# Patient Record
Sex: FEMALE | Race: BLACK OR AFRICAN AMERICAN | Employment: FULL TIME | URBAN - METROPOLITAN AREA
[De-identification: names, ages, dates, MRNs, and addresses within clinical notes are randomized per-mention and may not be internally consistent; named-entity substitution may affect disease eponyms.]

---

## 2017-01-19 ENCOUNTER — HOSPITAL ENCOUNTER (EMERGENCY)
Age: 52
Discharge: HOME | End: 2017-01-19
Attending: EMERGENCY MEDICINE
Payer: COMMERCIAL

## 2017-01-19 ENCOUNTER — APPOINTMENT (OUTPATIENT)
Dept: GENERAL RADIOLOGY | Age: 52
End: 2017-01-19
Attending: EMERGENCY MEDICINE
Payer: COMMERCIAL

## 2017-01-19 VITALS
RESPIRATION RATE: 13 BRPM | DIASTOLIC BLOOD PRESSURE: 84 MMHG | HEART RATE: 55 BPM | HEIGHT: 59 IN | SYSTOLIC BLOOD PRESSURE: 150 MMHG | BODY MASS INDEX: 26.21 KG/M2 | WEIGHT: 130 LBS | TEMPERATURE: 98.3 F | OXYGEN SATURATION: 98 %

## 2017-01-19 DIAGNOSIS — R42 VERTIGO: Primary | ICD-10-CM

## 2017-01-19 LAB
ALBUMIN SERPL BCP-MCNC: 3.7 G/DL (ref 3.5–5)
ALBUMIN/GLOB SERPL: 1 {RATIO} (ref 1.2–3.5)
ALP SERPL-CCNC: 61 U/L (ref 50–136)
ALT SERPL-CCNC: 30 U/L (ref 12–65)
ANION GAP BLD CALC-SCNC: 5 MMOL/L (ref 7–16)
AST SERPL W P-5'-P-CCNC: 24 U/L (ref 15–37)
ATRIAL RATE: 73 BPM
BASOPHILS # BLD AUTO: 0 K/UL (ref 0–0.2)
BASOPHILS # BLD: 0 % (ref 0–2)
BILIRUB SERPL-MCNC: 0.4 MG/DL (ref 0.2–1.1)
BUN SERPL-MCNC: 11 MG/DL (ref 6–23)
CALCIUM SERPL-MCNC: 8.8 MG/DL (ref 8.3–10.4)
CALCULATED P AXIS, ECG09: 94 DEGREES
CALCULATED R AXIS, ECG10: 85 DEGREES
CALCULATED T AXIS, ECG11: 66 DEGREES
CHLORIDE SERPL-SCNC: 106 MMOL/L (ref 98–107)
CO2 SERPL-SCNC: 29 MMOL/L (ref 21–32)
CREAT SERPL-MCNC: 0.67 MG/DL (ref 0.6–1)
DIAGNOSIS, 93000: NORMAL
DIASTOLIC BP, ECG02: NORMAL MMHG
DIFFERENTIAL METHOD BLD: ABNORMAL
EOSINOPHIL # BLD: 0 K/UL (ref 0–0.8)
EOSINOPHIL NFR BLD: 1 % (ref 0.5–7.8)
ERYTHROCYTE [DISTWIDTH] IN BLOOD BY AUTOMATED COUNT: 15.1 % (ref 11.9–14.6)
GLOBULIN SER CALC-MCNC: 3.6 G/DL (ref 2.3–3.5)
GLUCOSE SERPL-MCNC: 87 MG/DL (ref 65–100)
HCT VFR BLD AUTO: 39.5 % (ref 35.8–46.3)
HGB BLD-MCNC: 13.1 G/DL (ref 11.7–15.4)
IMM GRANULOCYTES # BLD: 0 K/UL (ref 0–0.5)
IMM GRANULOCYTES NFR BLD AUTO: 0 % (ref 0–5)
LYMPHOCYTES # BLD AUTO: 52 % (ref 13–44)
LYMPHOCYTES # BLD: 3 K/UL (ref 0.5–4.6)
MCH RBC QN AUTO: 23.4 PG (ref 26.1–32.9)
MCHC RBC AUTO-ENTMCNC: 33.2 G/DL (ref 31.4–35)
MCV RBC AUTO: 70.5 FL (ref 79.6–97.8)
MONOCYTES # BLD: 0.3 K/UL (ref 0.1–1.3)
MONOCYTES NFR BLD AUTO: 5 % (ref 4–12)
NEUTS SEG # BLD: 2.4 K/UL (ref 1.7–8.2)
NEUTS SEG NFR BLD AUTO: 42 % (ref 43–78)
P-R INTERVAL, ECG05: 180 MS
PLATELET # BLD AUTO: 169 K/UL (ref 150–450)
PMV BLD AUTO: 10.7 FL (ref 10.8–14.1)
POTASSIUM SERPL-SCNC: 3.8 MMOL/L (ref 3.5–5.1)
PROT SERPL-MCNC: 7.3 G/DL (ref 6.3–8.2)
Q-T INTERVAL, ECG07: 394 MS
QRS DURATION, ECG06: 76 MS
QTC CALCULATION (BEZET), ECG08: 434 MS
RBC # BLD AUTO: 5.6 M/UL (ref 4.05–5.25)
SODIUM SERPL-SCNC: 140 MMOL/L (ref 136–145)
SYSTOLIC BP, ECG01: NORMAL MMHG
TROPONIN I BLD-MCNC: 0 NG/ML (ref 0–0.08)
VENTRICULAR RATE, ECG03: 73 BPM
WBC # BLD AUTO: 5.7 K/UL (ref 4.3–11.1)

## 2017-01-19 PROCEDURE — 71020 XR CHEST PA LAT: CPT

## 2017-01-19 PROCEDURE — 85025 COMPLETE CBC W/AUTO DIFF WBC: CPT | Performed by: EMERGENCY MEDICINE

## 2017-01-19 PROCEDURE — 80053 COMPREHEN METABOLIC PANEL: CPT | Performed by: EMERGENCY MEDICINE

## 2017-01-19 PROCEDURE — 84484 ASSAY OF TROPONIN QUANT: CPT

## 2017-01-19 PROCEDURE — 93005 ELECTROCARDIOGRAM TRACING: CPT | Performed by: EMERGENCY MEDICINE

## 2017-01-19 PROCEDURE — 99284 EMERGENCY DEPT VISIT MOD MDM: CPT | Performed by: EMERGENCY MEDICINE

## 2017-01-19 RX ORDER — SODIUM CHLORIDE 0.9 % (FLUSH) 0.9 %
5-10 SYRINGE (ML) INJECTION AS NEEDED
Status: DISCONTINUED | OUTPATIENT
Start: 2017-01-19 | End: 2017-01-19 | Stop reason: HOSPADM

## 2017-01-19 RX ORDER — MECLIZINE HYDROCHLORIDE 25 MG/1
25 TABLET ORAL
Qty: 11 TAB | Refills: 0 | Status: SHIPPED | OUTPATIENT
Start: 2017-01-19 | End: 2017-01-29

## 2017-01-19 NOTE — LETTER
[address] [facility] EMERGENCY DEPT 
[address] [place] [id number] [phone] Work/School Note Date: 1/19/2017 To Whom It May concern: 
 
Phuong Bradshaw was seen and treated today in the emergency room by the following provider(s): 
Attending Provider: Kalli Finley MD.   
 
Phuong Bradshaw may return to work on 1/20/2017. Sincerely, Eloisa Batista

## 2017-01-19 NOTE — ED PROVIDER NOTES
HPI Comments: 66-year-old lady with a history of waking up feeling very dizzy. She said it felt like the room was spinning around her. Patient says that with the symptoms she had no weakness, numbness, or speech problems. Patient says she's never had this kind of problem before and as far she knows she has no known medical problems. She denies any fevers or chills. Patient says she did feel like she had some palpitations was not having any significant chest pain. Patient is a 46 y.o. female presenting with dizziness. The history is provided by the patient. Dizziness   Pertinent negatives include no shortness of breath, no chest pain, no vomiting, no confusion, no headaches and no nausea. Past Medical History:   Diagnosis Date    Anemia      not currently taking iron supplements    History of chicken pox     OAB (overactive bladder) 7/3/2014       Past Surgical History:   Procedure Laterality Date    Hx orthopaedic       right ankle    Hx tubal ligation      Hx dilation and curettage      Hx hysterectomy  2013     [facility]    Hx  section       x3         Family History:   Problem Relation Age of Onset    Hypertension Father     Diabetes Father     Colon Cancer Father 76    Malignant Hyperthermia Neg Hx     Pseudocholinesterase Deficiency Neg Hx     Delayed Awakening Neg Hx     Post-op Nausea/Vomiting Neg Hx     Emergence Delirium Neg Hx     Post-op Cognitive Dysfunction Neg Hx     Other Neg Hx     Cancer Neg Hx     Heart Attack Neg Hx     Breast Cancer Neg Hx     Ovarian Cancer Neg Hx        Social History     Social History    Marital status: SINGLE     Spouse name: N/A    Number of children: N/A    Years of education: N/A     Occupational History    Not on file.      Social History Main Topics    Smoking status: Current Every Day Smoker     Packs/day: 1.00     Years: 35.00    Smokeless tobacco: Never Used      Comment: 16 years    Alcohol use 0.0 oz/week     0 Standard drinks or equivalent per week      Comment: occosaional/social    Drug use: No    Sexual activity: Yes     Partners: Male     Birth control/ protection: Surgical     Other Topics Concern    Not on file     Social History Narrative         ALLERGIES: Review of patient's allergies indicates no known allergies. Review of Systems   Constitutional: Negative for chills, diaphoresis and fever. HENT: Negative for congestion, rhinorrhea and sore throat. Eyes: Negative for redness and visual disturbance. Respiratory: Negative for cough, chest tightness, shortness of breath and wheezing. Cardiovascular: Positive for palpitations. Negative for chest pain. Gastrointestinal: Negative for abdominal pain, blood in stool, diarrhea, nausea and vomiting. Endocrine: Negative for polydipsia and polyuria. Genitourinary: Negative for dysuria and hematuria. Musculoskeletal: Negative for arthralgias, myalgias and neck stiffness. Skin: Negative for rash. Allergic/Immunologic: Negative for environmental allergies and food allergies. Neurological: Positive for dizziness. Negative for weakness and headaches. Hematological: Negative for adenopathy. Does not bruise/bleed easily. Psychiatric/Behavioral: Negative for confusion and sleep disturbance. The patient is not nervous/anxious. Vitals:    01/19/17 0813 01/19/17 0819 01/19/17 0854 01/19/17 0907   BP: 185/88 154/85 155/84 151/86   Pulse: 73 70 (!) 59 (!) 59   Resp: 16 19 22 24   Temp: 97.9 °F (36.6 °C)      SpO2: 99% 99% 97%    Weight: 59 kg (130 lb)      Height: 4' 11\" (1.499 m)               Physical Exam   Constitutional: She is oriented to person, place, and time. She appears well-developed and well-nourished. HENT:   Head: Normocephalic and atraumatic. Eyes: Conjunctivae and EOM are normal. Pupils are equal, round, and reactive to light. Neck: Normal range of motion. Cardiovascular: Normal rate and regular rhythm. Pulmonary/Chest: Effort normal and breath sounds normal. No respiratory distress. She has no wheezes. She has no rales. She exhibits no tenderness. Abdominal: Soft. Bowel sounds are normal. There is no rebound and no guarding. Musculoskeletal: Normal range of motion. She exhibits no edema or tenderness. Lymphadenopathy:     She has no cervical adenopathy. Neurological: She is alert and oriented to person, place, and time. She has normal reflexes. Coordination normal.   Skin: Skin is warm and dry. Psychiatric: She has a normal mood and affect. Nursing note and vitals reviewed. MDM  Number of Diagnoses or Management Options  Diagnosis management comments: Patient's labs and cardiac monitoring are unremarkable. Her symptoms seem classic for vertigo. She had no coordination problems on exam.  Therefore I will discharge her home with some Antivert.     ED Course       Procedures

## 2017-01-19 NOTE — DISCHARGE INSTRUCTIONS
Return with any fevers, weakness, speech problems, worsening symptoms, or additional concerns. Follow-up with your primary care doctor in one or 2 days for reevaluation. Epley Maneuver for Vertigo: Exercises  Your Care Instructions  The Epley Maneuver is a series of movements your doctor may use to treat your vertigo. Here are the steps for the exercises. Your doctor or physical therapist will guide you through the movements. A single 10- to 15-minute session often is all that's needed. Crystal debris (canaliths) cause the vertigo. When your head is moved into different positions, the debris moves freely. This may cause your symptoms to stop. How to do the exercises  Step 1    · You will sit on the doctor's exam table. Your legs will be out in front of you. The doctor or physical therapist will turn your head so that it is long-term between looking straight ahead and looking to the side that causes the worst vertigo. · Without changing your head position, he or she will guide you back quickly. Your shoulders will be on the table. Your head will hang over the edge of the table. At this point, the side of your head that is causing the worst vertigo will face the floor. You'll stay in this position for 30 seconds or until your symptoms stop. Step 2    · Then, the doctor or physical therapist will turn your head to the other side. You don't need to lift your head. The other side of your head will face the floor. You will stay in this position for 30 seconds or until your symptoms stop. Step 3    · The doctor or physical therapist will help you roll your body in the same direction that your head is facing. You will lie on your side. (For example, if you are looking to your right, you will roll onto your right side.) The side that causes the worst symptoms should be facing up. You'll stay in this position for another 30 seconds or until your symptoms stop.   Step 4    · The doctor or physical therapist will then help you to sit back up. Your legs will hang off the table on the same side that you were facing. Follow-up care is a key part of your treatment and safety. Be sure to make and go to all appointments, and call your doctor if you are having problems. It's also a good idea to know your test results and keep a list of the medicines you take. Where can you learn more? Go to http://saurav-allison.info/. Enter E629 in the search box to learn more about \"Epley Maneuver for Vertigo: Exercises. \"  Current as of: July 29, 2016  Content Version: 11.1  © 6665-5782 [employer] Care instructions adapted under license by [employer] (which disclaims liability or warranty for this information). If you have questions about a medical condition or this instruction, always ask your healthcare professional. [address] any warranty or liability for your use of this information.

## 2017-01-19 NOTE — LETTER
[address] [facility] EMERGENCY DEPT 
[address] [place] [id number] 
[phone] Work/School Note Date: 1/19/2017 To Whom It May concern: 
 
Nadiya Dover was seen and treated today in the emergency room by the following provider(s): 
No providers found. Nadiya Dover may return to work on 1/23/2017. Sincerely, Willis Duverney

## 2018-03-15 PROBLEM — I10 ESSENTIAL HYPERTENSION: Status: ACTIVE | Noted: 2018-03-15

## 2018-03-15 PROBLEM — I70.0 ATHEROSCLEROSIS OF ABDOMINAL AORTA (HCC): Status: ACTIVE | Noted: 2018-03-15

## 2018-03-15 PROBLEM — G25.0 BENIGN ESSENTIAL TREMOR: Status: ACTIVE | Noted: 2018-03-15

## 2018-12-19 RX ORDER — SODIUM CHLORIDE 0.9 % (FLUSH) 0.9 %
5-10 SYRINGE (ML) INJECTION EVERY 8 HOURS
Status: CANCELLED | OUTPATIENT
Start: 2018-12-19

## 2018-12-19 RX ORDER — SODIUM CHLORIDE 0.9 % (FLUSH) 0.9 %
5-10 SYRINGE (ML) INJECTION AS NEEDED
Status: CANCELLED | OUTPATIENT
Start: 2018-12-19

## 2018-12-21 ENCOUNTER — HOSPITAL ENCOUNTER (OUTPATIENT)
Dept: SURGERY | Age: 53
Discharge: HOME | End: 2018-12-21

## 2018-12-26 ENCOUNTER — HOSPITAL ENCOUNTER (OUTPATIENT)
Dept: LAB | Age: 53
Discharge: HOME | End: 2018-12-26
Payer: COMMERCIAL

## 2018-12-26 VITALS — HEIGHT: 59 IN | BODY MASS INDEX: 24.19 KG/M2 | WEIGHT: 120 LBS

## 2018-12-26 LAB — HGB BLD-MCNC: 12.5 G/DL (ref 11.7–15.4)

## 2018-12-26 PROCEDURE — 36415 COLL VENOUS BLD VENIPUNCTURE: CPT

## 2018-12-26 PROCEDURE — 85018 HEMOGLOBIN: CPT

## 2018-12-26 RX ORDER — CHOLECALCIFEROL (VITAMIN D3) 125 MCG
CAPSULE ORAL AS NEEDED
COMMUNITY
End: 2021-02-19

## 2018-12-26 RX ORDER — ACETAMINOPHEN 325 MG/1
TABLET ORAL
COMMUNITY
End: 2020-11-25 | Stop reason: ALTCHOICE

## 2018-12-26 RX ORDER — IBUPROFEN 200 MG
200 TABLET ORAL
COMMUNITY
End: 2020-11-25 | Stop reason: ALTCHOICE

## 2018-12-26 NOTE — PERIOP NOTES
HGB done today WNL    Recent Results (from the past 12 hour(s))   HEMOGLOBIN    Collection Time: 12/26/18  2:10 PM   Result Value Ref Range    HGB 12.5 11.7 - 15.4 g/dL

## 2018-12-26 NOTE — PERIOP NOTES
Patient verified name and . Order for consent yes found in EHR and matches case posting; patient verifies procedure. Type 1b surgery, phone assessment complete. Orders yes received. Labs per surgeon: none  Labs per anesthesia protocol: hemoglobin per protocol~to be drawn at outpt lab. Patient answered medical/surgical history questions at their best of ability. All prior to admission medications documented in [facility]. Patient instructed to take the following medications the day of surgery according to anesthesia guidelines with a small sip of water: none . Hold all vitamins 7 days prior to surgery and NSAIDS 5 days prior to surgery. Medications to be held ADvil and Aleve for 5 days prior to surgery. Patient instructed on the following:  Arrive at A Entrance, time of arrival to be called the day before by 1700  NPO after midnight including gum, mints, and ice chips  Responsible adult must drive patient to the hospital, stay during surgery, and patient will  need supervision 24 hours after anesthesia  Use antibacterial soap in shower the night before surgery and on the morning of surgery  All piercings must be removed prior to arrival.    Leave all valuables (money and jewelry) at home but bring insurance card and ID on       DOS. Do not wear make-up, nail polish, lotions, cologne, perfumes, powders, or oil on skin. Patient teach back successful and patient demonstrates knowledge of instruction.

## 2018-12-27 ENCOUNTER — ANESTHESIA EVENT (OUTPATIENT)
Dept: SURGERY | Age: 53
End: 2018-12-27
Payer: COMMERCIAL

## 2018-12-27 RX ORDER — SODIUM CHLORIDE 0.9 % (FLUSH) 0.9 %
5-10 SYRINGE (ML) INJECTION AS NEEDED
Status: CANCELLED | OUTPATIENT
Start: 2018-12-27

## 2018-12-27 RX ORDER — SODIUM CHLORIDE 0.9 % (FLUSH) 0.9 %
5-10 SYRINGE (ML) INJECTION EVERY 8 HOURS
Status: CANCELLED | OUTPATIENT
Start: 2018-12-27

## 2018-12-28 ENCOUNTER — APPOINTMENT (OUTPATIENT)
Dept: GENERAL RADIOLOGY | Age: 53
End: 2018-12-28
Attending: PODIATRIST
Payer: COMMERCIAL

## 2018-12-28 ENCOUNTER — HOSPITAL ENCOUNTER (OUTPATIENT)
Age: 53
Setting detail: OUTPATIENT SURGERY
Discharge: HOME | End: 2018-12-28
Attending: PODIATRIST | Admitting: PODIATRIST
Payer: COMMERCIAL

## 2018-12-28 ENCOUNTER — ANESTHESIA (OUTPATIENT)
Dept: SURGERY | Age: 53
End: 2018-12-28
Payer: COMMERCIAL

## 2018-12-28 VITALS
SYSTOLIC BLOOD PRESSURE: 132 MMHG | BODY MASS INDEX: 24.19 KG/M2 | OXYGEN SATURATION: 96 % | TEMPERATURE: 98.8 F | HEART RATE: 85 BPM | WEIGHT: 120 LBS | HEIGHT: 59 IN | RESPIRATION RATE: 16 BRPM | DIASTOLIC BLOOD PRESSURE: 70 MMHG

## 2018-12-28 PROCEDURE — C1776 JOINT DEVICE (IMPLANTABLE): HCPCS | Performed by: PODIATRIST

## 2018-12-28 PROCEDURE — 74011000250 HC RX REV CODE- 250

## 2018-12-28 PROCEDURE — 77030002916 HC SUT ETHLN J&J -A: Performed by: PODIATRIST

## 2018-12-28 PROCEDURE — 74011000250 HC RX REV CODE- 250: Performed by: PODIATRIST

## 2018-12-28 PROCEDURE — 76210000021 HC REC RM PH II 0.5 TO 1 HR: Performed by: PODIATRIST

## 2018-12-28 PROCEDURE — 77030031139 HC SUT VCRL2 J&J -A: Performed by: PODIATRIST

## 2018-12-28 PROCEDURE — 77030010509 HC AIRWY LMA MSK TELE -A: Performed by: ANESTHESIOLOGY

## 2018-12-28 PROCEDURE — 77030013921: Performed by: PODIATRIST

## 2018-12-28 PROCEDURE — 88305 TISSUE EXAM BY PATHOLOGIST: CPT

## 2018-12-28 PROCEDURE — 74011250636 HC RX REV CODE- 250/636: Performed by: ANESTHESIOLOGY

## 2018-12-28 PROCEDURE — 76010000162 HC OR TIME 1.5 TO 2 HR INTENSV-TIER 1: Performed by: PODIATRIST

## 2018-12-28 PROCEDURE — 76210000006 HC OR PH I REC 0.5 TO 1 HR: Performed by: PODIATRIST

## 2018-12-28 PROCEDURE — 77030006788 HC BLD SAW OSC STRY -B: Performed by: PODIATRIST

## 2018-12-28 PROCEDURE — 77030018836 HC SOL IRR NACL ICUM -A: Performed by: PODIATRIST

## 2018-12-28 PROCEDURE — 74011250636 HC RX REV CODE- 250/636

## 2018-12-28 PROCEDURE — 73630 X-RAY EXAM OF FOOT: CPT

## 2018-12-28 PROCEDURE — 74011250636 HC RX REV CODE- 250/636: Performed by: PODIATRIST

## 2018-12-28 PROCEDURE — 88311 DECALCIFY TISSUE: CPT

## 2018-12-28 PROCEDURE — 76060000034 HC ANESTHESIA 1.5 TO 2 HR: Performed by: PODIATRIST

## 2018-12-28 PROCEDURE — 77030020782 HC GWN BAIR PAWS FLX 3M -B: Performed by: ANESTHESIOLOGY

## 2018-12-28 PROCEDURE — C1713 ANCHOR/SCREW BN/BN,TIS/BN: HCPCS | Performed by: PODIATRIST

## 2018-12-28 DEVICE — IMPLANTABLE DEVICE
Type: IMPLANTABLE DEVICE | Site: FOOT | Status: FUNCTIONAL
Brand: FUSEFORCE

## 2018-12-28 DEVICE — JOINT METATRSL MTP 10MM --: Type: IMPLANTABLE DEVICE | Site: FOOT | Status: FUNCTIONAL

## 2018-12-28 RX ORDER — LIDOCAINE HYDROCHLORIDE 20 MG/ML
INJECTION, SOLUTION EPIDURAL; INFILTRATION; INTRACAUDAL; PERINEURAL AS NEEDED
Status: DISCONTINUED | OUTPATIENT
Start: 2018-12-28 | End: 2018-12-28 | Stop reason: HOSPADM

## 2018-12-28 RX ORDER — MIDAZOLAM HYDROCHLORIDE 1 MG/ML
2 INJECTION, SOLUTION INTRAMUSCULAR; INTRAVENOUS ONCE
Status: DISCONTINUED | OUTPATIENT
Start: 2018-12-28 | End: 2018-12-28 | Stop reason: HOSPADM

## 2018-12-28 RX ORDER — FLUMAZENIL 0.1 MG/ML
0.2 INJECTION INTRAVENOUS
Status: DISCONTINUED | OUTPATIENT
Start: 2018-12-28 | End: 2018-12-28 | Stop reason: HOSPADM

## 2018-12-28 RX ORDER — OXYCODONE HYDROCHLORIDE 5 MG/1
5 TABLET ORAL
Status: DISCONTINUED | OUTPATIENT
Start: 2018-12-28 | End: 2018-12-28 | Stop reason: HOSPADM

## 2018-12-28 RX ORDER — CEFAZOLIN SODIUM/WATER 2 G/20 ML
2 SYRINGE (ML) INTRAVENOUS ONCE
Status: COMPLETED | OUTPATIENT
Start: 2018-12-28 | End: 2018-12-28

## 2018-12-28 RX ORDER — FENTANYL CITRATE 50 UG/ML
INJECTION, SOLUTION INTRAMUSCULAR; INTRAVENOUS AS NEEDED
Status: DISCONTINUED | OUTPATIENT
Start: 2018-12-28 | End: 2018-12-28 | Stop reason: HOSPADM

## 2018-12-28 RX ORDER — SODIUM CHLORIDE, SODIUM LACTATE, POTASSIUM CHLORIDE, CALCIUM CHLORIDE 600; 310; 30; 20 MG/100ML; MG/100ML; MG/100ML; MG/100ML
100 INJECTION, SOLUTION INTRAVENOUS CONTINUOUS
Status: DISCONTINUED | OUTPATIENT
Start: 2018-12-28 | End: 2018-12-28 | Stop reason: HOSPADM

## 2018-12-28 RX ORDER — DIPHENHYDRAMINE HYDROCHLORIDE 50 MG/ML
12.5 INJECTION, SOLUTION INTRAMUSCULAR; INTRAVENOUS
Status: DISCONTINUED | OUTPATIENT
Start: 2018-12-28 | End: 2018-12-28 | Stop reason: HOSPADM

## 2018-12-28 RX ORDER — MIDAZOLAM HYDROCHLORIDE 1 MG/ML
2 INJECTION, SOLUTION INTRAMUSCULAR; INTRAVENOUS
Status: COMPLETED | OUTPATIENT
Start: 2018-12-28 | End: 2018-12-28

## 2018-12-28 RX ORDER — PROPOFOL 10 MG/ML
INJECTION, EMULSION INTRAVENOUS AS NEEDED
Status: DISCONTINUED | OUTPATIENT
Start: 2018-12-28 | End: 2018-12-28 | Stop reason: HOSPADM

## 2018-12-28 RX ORDER — NALOXONE HYDROCHLORIDE 0.4 MG/ML
0.2 INJECTION, SOLUTION INTRAMUSCULAR; INTRAVENOUS; SUBCUTANEOUS AS NEEDED
Status: DISCONTINUED | OUTPATIENT
Start: 2018-12-28 | End: 2018-12-28 | Stop reason: HOSPADM

## 2018-12-28 RX ORDER — ACETAMINOPHEN 500 MG
1000 TABLET ORAL ONCE
Status: DISCONTINUED | OUTPATIENT
Start: 2018-12-28 | End: 2018-12-28 | Stop reason: HOSPADM

## 2018-12-28 RX ORDER — LIDOCAINE HYDROCHLORIDE 10 MG/ML
0.1 INJECTION INFILTRATION; PERINEURAL AS NEEDED
Status: DISCONTINUED | OUTPATIENT
Start: 2018-12-28 | End: 2018-12-28 | Stop reason: HOSPADM

## 2018-12-28 RX ORDER — DEXAMETHASONE SODIUM PHOSPHATE 4 MG/ML
INJECTION, SOLUTION INTRA-ARTICULAR; INTRALESIONAL; INTRAMUSCULAR; INTRAVENOUS; SOFT TISSUE AS NEEDED
Status: DISCONTINUED | OUTPATIENT
Start: 2018-12-28 | End: 2018-12-28 | Stop reason: HOSPADM

## 2018-12-28 RX ORDER — BUPIVACAINE HYDROCHLORIDE 7.5 MG/ML
INJECTION, SOLUTION EPIDURAL; RETROBULBAR AS NEEDED
Status: DISCONTINUED | OUTPATIENT
Start: 2018-12-28 | End: 2018-12-28 | Stop reason: HOSPADM

## 2018-12-28 RX ORDER — FENTANYL CITRATE 50 UG/ML
100 INJECTION, SOLUTION INTRAMUSCULAR; INTRAVENOUS ONCE
Status: DISCONTINUED | OUTPATIENT
Start: 2018-12-28 | End: 2018-12-28 | Stop reason: HOSPADM

## 2018-12-28 RX ORDER — EPHEDRINE SULFATE 50 MG/ML
INJECTION, SOLUTION INTRAVENOUS AS NEEDED
Status: DISCONTINUED | OUTPATIENT
Start: 2018-12-28 | End: 2018-12-28 | Stop reason: HOSPADM

## 2018-12-28 RX ORDER — ONDANSETRON 2 MG/ML
INJECTION INTRAMUSCULAR; INTRAVENOUS AS NEEDED
Status: DISCONTINUED | OUTPATIENT
Start: 2018-12-28 | End: 2018-12-28 | Stop reason: HOSPADM

## 2018-12-28 RX ORDER — HYDROMORPHONE HYDROCHLORIDE 2 MG/ML
0.5 INJECTION, SOLUTION INTRAMUSCULAR; INTRAVENOUS; SUBCUTANEOUS
Status: DISCONTINUED | OUTPATIENT
Start: 2018-12-28 | End: 2018-12-28 | Stop reason: HOSPADM

## 2018-12-28 RX ORDER — OXYCODONE HYDROCHLORIDE 5 MG/1
10 TABLET ORAL
Status: DISCONTINUED | OUTPATIENT
Start: 2018-12-28 | End: 2018-12-28 | Stop reason: HOSPADM

## 2018-12-28 RX ORDER — KETOROLAC TROMETHAMINE 30 MG/ML
INJECTION, SOLUTION INTRAMUSCULAR; INTRAVENOUS AS NEEDED
Status: DISCONTINUED | OUTPATIENT
Start: 2018-12-28 | End: 2018-12-28 | Stop reason: HOSPADM

## 2018-12-28 RX ADMIN — EPHEDRINE SULFATE 5 MG: 50 INJECTION, SOLUTION INTRAVENOUS at 09:13

## 2018-12-28 RX ADMIN — EPHEDRINE SULFATE 5 MG: 50 INJECTION, SOLUTION INTRAVENOUS at 09:52

## 2018-12-28 RX ADMIN — Medication 2 G: at 08:32

## 2018-12-28 RX ADMIN — LIDOCAINE HYDROCHLORIDE 0.1 ML: 10 INJECTION, SOLUTION INFILTRATION; PERINEURAL at 08:11

## 2018-12-28 RX ADMIN — FENTANYL CITRATE 25 MCG: 50 INJECTION, SOLUTION INTRAMUSCULAR; INTRAVENOUS at 10:10

## 2018-12-28 RX ADMIN — EPHEDRINE SULFATE 5 MG: 50 INJECTION, SOLUTION INTRAVENOUS at 09:01

## 2018-12-28 RX ADMIN — EPHEDRINE SULFATE 10 MG: 50 INJECTION, SOLUTION INTRAVENOUS at 09:04

## 2018-12-28 RX ADMIN — KETOROLAC TROMETHAMINE 15 MG: 30 INJECTION, SOLUTION INTRAMUSCULAR; INTRAVENOUS at 09:40

## 2018-12-28 RX ADMIN — FENTANYL CITRATE 25 MCG: 50 INJECTION, SOLUTION INTRAMUSCULAR; INTRAVENOUS at 08:55

## 2018-12-28 RX ADMIN — LIDOCAINE HYDROCHLORIDE 60 MG: 20 INJECTION, SOLUTION EPIDURAL; INFILTRATION; INTRACAUDAL; PERINEURAL at 08:35

## 2018-12-28 RX ADMIN — PROPOFOL 200 MG: 10 INJECTION, EMULSION INTRAVENOUS at 08:35

## 2018-12-28 RX ADMIN — EPHEDRINE SULFATE 5 MG: 50 INJECTION, SOLUTION INTRAVENOUS at 09:35

## 2018-12-28 RX ADMIN — DEXAMETHASONE SODIUM PHOSPHATE 10 MG: 4 INJECTION, SOLUTION INTRA-ARTICULAR; INTRALESIONAL; INTRAMUSCULAR; INTRAVENOUS; SOFT TISSUE at 08:56

## 2018-12-28 RX ADMIN — FENTANYL CITRATE 25 MCG: 50 INJECTION, SOLUTION INTRAMUSCULAR; INTRAVENOUS at 09:26

## 2018-12-28 RX ADMIN — EPHEDRINE SULFATE 15 MG: 50 INJECTION, SOLUTION INTRAVENOUS at 08:42

## 2018-12-28 RX ADMIN — FENTANYL CITRATE 50 MCG: 50 INJECTION, SOLUTION INTRAMUSCULAR; INTRAVENOUS at 08:35

## 2018-12-28 RX ADMIN — FENTANYL CITRATE 25 MCG: 50 INJECTION, SOLUTION INTRAMUSCULAR; INTRAVENOUS at 09:28

## 2018-12-28 RX ADMIN — SODIUM CHLORIDE, SODIUM LACTATE, POTASSIUM CHLORIDE, AND CALCIUM CHLORIDE: 600; 310; 30; 20 INJECTION, SOLUTION INTRAVENOUS at 10:02

## 2018-12-28 RX ADMIN — FENTANYL CITRATE 50 MCG: 50 INJECTION, SOLUTION INTRAMUSCULAR; INTRAVENOUS at 09:46

## 2018-12-28 RX ADMIN — MIDAZOLAM 2 MG: 1 INJECTION INTRAMUSCULAR; INTRAVENOUS at 08:12

## 2018-12-28 RX ADMIN — SODIUM CHLORIDE, SODIUM LACTATE, POTASSIUM CHLORIDE, AND CALCIUM CHLORIDE 100 ML/HR: 600; 310; 30; 20 INJECTION, SOLUTION INTRAVENOUS at 08:11

## 2018-12-28 RX ADMIN — EPHEDRINE SULFATE 5 MG: 50 INJECTION, SOLUTION INTRAVENOUS at 09:15

## 2018-12-28 RX ADMIN — ONDANSETRON 4 MG: 2 INJECTION INTRAMUSCULAR; INTRAVENOUS at 08:58

## 2018-12-28 NOTE — ANESTHESIA PREPROCEDURE EVALUATION
Anesthetic History No history of anesthetic complications Review of Systems / Medical History Patient summary reviewed and pertinent labs reviewed Pulmonary Smoker Neuro/Psych Within defined limits Cardiovascular Exercise tolerance: >4 METS 
  
GI/Hepatic/Renal 
  
GERD Endo/Other Arthritis Other Findings Physical Exam 
 
Airway Mallampati: II 
TM Distance: 4 - 6 cm Neck ROM: normal range of motion Mouth opening: Normal 
 
 Cardiovascular Rhythm: regular Rate: normal 
 
 
 
 Dental 
 
 
  
Pulmonary Breath sounds clear to auscultation Abdominal 
 
 
 
 Other Findings Anesthetic Plan ASA: 2 Anesthesia type: general 
 
 
 
 
Induction: Intravenous Anesthetic plan and risks discussed with: Patient and Spouse

## 2018-12-28 NOTE — DISCHARGE INSTRUCTIONS
Leave the dressing on, clean and dry. Expect bleeding to occur through Sunday morning. Remain in the post operative shoe except for hygiene    Apply ice behind the knee of the operative foot to prevent swelling    Elevate above heart level    Dr. Dolores Chahal can be reached for urgent matters after hours at [phone]. Allow 1 hour for return call.        Instructions Following Ambulatory Surgery    Activity  · As tolerated and directed by your doctor  · Bathe or shower as directed by your doctor    Diet  · Clear liquids until no nausea or vomiting; then light diet for the first day  · Advance to regular diet on second day, unless your doctor orders otherwise  · If nausea and vomiting continues, call your doctor    Pain  · Take pain medication as directed by your doctor  ·  Call your doctor if pain is NOT relieved by medication  · DO NOT take aspirin or blood thinners until directed by your doctor    Follow-Up Phone Calls  · Will be made nursing staff  · If you have any problems, call your doctor as needed    Call your doctor if  · Excessive bleeding that does not stop after holding mild pressure over the area  · Temperature of 101 degrees F or above  · Redness,excessive swelling or bruising, and/or green or yellow, smelly discharge from incision    After Anesthesia  · For the first 24 hours: DO NOT Drive, Drink alcoholic beverages, or Make important decisions  · Be aware of dizziness following anesthesia and while taking pain medication

## 2018-12-28 NOTE — OP NOTES
Operative Report     Patient: Xiomy Blackwood MRN: [id number]  SSN: [id number]    YOB: 1965  Age: 48 y.o. Sex: female       Indications: The patient was admitted to the hospital with a history of hallux rigidus and painful lesion sub 5th metatarsal head right foot     Date of Surgery: 12/28/2018     Preoperative Diagnosis:    Acquired hallux valgus of right foot [M20.11]  Hallux rigidus, right foot [M20.21]  Foot deformity, right [M21.961]    Postoperative Diagnosis:   Acquired hallux valgus of right foot [M20.11]  Hallux rigidus, right foot [M20.21]  Foot deformity, right [M21.961]     Surgeon(s) and Role:     * Sophie Gonzales DPM - Primary      Anesthesia: General    Procedure:  Procedure(s):  RIGHT AKIN OSTEOTOMY    RIGHT FIRST MPJ CHEILECTOMY  WITH CARTIVA IMPLANT  EXCISION BONE RIGHT 5TH METATARSAL HEAD    Procedure in Detail:     The patient was transported to the operating room and placed on the operating table in the supine position. A time out and briefing were then performed with all members of the operating room staff to verify the correct patient, location and procedure. A well padded pneumatic tourniquet was applied to the right calf. The right lower extremity was then scrubbed, prepped and draped in sterile fashion. An Esmark bandage was used for exsanguination and the tourniquet was inflated to 250 mmHg.      Attention was then directed to the right foot where a linear incision was made overlying the 1st ray just medial to the EHL with a #15 blade. Sharp and blunt dissection was carried to the capsule and periosteum layer which was then resected from the bone with a #15 blade to expose the bone. Superficial bleeders were cauterized with a Bovie and vital neurovascular structures were carefully retracted from the surgical site. Osteophytes were encountered to the dorsal, medial and lateral 1st metatarsal head as well as the proximal phalanx base and were resected with a rongeur. All rough edges were smooth with a rasp. Copious irrigation occurred. The metatarsal head was inspected and noted to have central erosion of cartilage of 25% articular surface with eburnated bone exposed. Attention was directed to the proximal phalanx where a transverse closing wedge with the apex lateral was performed with a sagittal saw and closed medially to reduce the valgus deformity to the hallux. Once the osteotomy was closed sufficiently, fixation was obtained with a dorsally placed [facility] 10 mm x 10 mm staple.      Attention was returned to the metatarsal. Using the size guide for a 10 mm Cartiva implant, a guide wire was placed centrally in the metatarsal head. Upon removal of the size guide, there was noted proper bone surrounding the planned implant site. The reamer was introduced tot he 1st metatarsal head. Irrigation occurred to remove bone fragments. The size 10 mm Cartiva implant was then placed into the metatarsal head and noted to have appropriate bone clearance. ROM of the 1st MPJ was full and fluid. A linear skin incision was then made overlying the dorsal lateral 5th metatarsal right foot.  Sharp and blunt dissection was carried to the capsule and periosteum layer which was then resected from the bone with a #15 blade to expose the bone. Superficial bleeders were cauterized with a Bovie and vital neurovascular structures were carefully retracted from the surgical site. A large articulating bone fragment was encountered plantar to the metatarsal head and directly overlying the painful skin lesion. This was excised and full decompression of the skin was noted. The metatarsal head was rasped smooth with a power rasp and no pressure points remained. Copious irrigation then occurred with normal saline. Deep closure was obtained with 4-0 Vicryl and skin with 3-0 Nylon. A post operative injection of 10 cc Marcaine 0.75% plain was infiltrated to the operative site.  The right lower extremity was then dressed with xeroform, 4x4 gauze, Xuan, Webril and an ace bandage. The tourniquet was deflated at 78 minutes and immediate return of capillary refill was noted to all digits. The patient was transported to the PACU with vital signs stable. Findings:  first metatarsal head with central 25% cartilage loss. Large bone fragment with articiulation tot eh sub 5th metatarsal head    Estimated Blood Loss:  minimal    Drains: none           Specimens:   ID Type Source Tests Collected by Time Destination   1 : Right 5th metatarsal bone Preservative Foot, Right  Lenka Landa 12/28/2018 2210 Pathology               Implants:    Implant Name Type Inv.  Item Serial No.  Lot No. LRB No. Used Action   KIT IMPL [id number] NIT STRL -- Allison Amabile - [id number]  KIT IMPL [id number] NIT STRL -- [employer] [id number] [id number] Right 1 Implanted   JOINT METATRSL MTP 10MM --  - [id number]  JOINT METATRSL MTP 10MM --  [id number] [employer] [id number] Right 1 Implanted              Complications:  None    Signed By: Blaine Yates DPM     December 28, 2018

## 2018-12-28 NOTE — BRIEF OP NOTE
BRIEF OPERATIVE NOTE Date of Procedure: 12/28/2018 Preoperative Diagnosis: Acquired hallux valgus of right foot [M20.11] Hallux rigidus, right foot [M20.21] Foot deformity, right [M21.961] Postoperative Diagnosis: Acquired hallux valgus of right foot [M20.11] Hallux rigidus, right foot [M20.21] Foot deformity, right [M21.961] Procedure(s): RIGHT AKIN OSTEOTOMY  
RIGHT 1st MPJ CHEILECTOMY WITH CARTIVA IMPLANT 
RIGHT EXCISION BONE 5TH METATARSAL Surgeon(s) and Role: * Thomas Gonzales DPM - Primary Surgical Assistant: None Surgical Staff: 
Circ-1: Terra Sanford RN Scrub Tech-1: Phoenix Pickard Scrub RN-Relief: Saray Hoyos RN Event Time In Time Out Incision Start 4461 Incision Close 1008 Anesthesia: General  
Estimated Blood Loss: minimal 
Specimens:  
ID Type Source Tests Collected by Time Destination 1 : Right 5th metatarsal bone Preservative Foot, Right  Castro Burner 12/28/2018 5859 Pathology Findings: 1st MPJ with central 25% cartilage loss, large bone fragments tot he inferior 5th metatarsal head underlying painful callusing Complications: none Implants:  
Implant Name Type Inv. Item Serial No.  Lot No. LRB No. Used Action KIT IMPL [id number] NIT STRL -- [place] [employer] - [id number]  KIT IMPL [id number] NIT STRL -- [employer] [id number] [facility] [employer] [facility] [id number] Right 1 Implanted JOINT METATRSL MTP 10MM --  - [id number]  JOINT METATRSL MTP 10MM --  [id number] [employer] [id number] Right 1 Implanted

## 2018-12-28 NOTE — ANESTHESIA POSTPROCEDURE EVALUATION
Procedure(s): RIGHT AKIN OSTEOTOMY , Excision of bone 5th metatarsal right RIGHT WITH IMPLANT  FIRST MPJ CHEILECTOMY  GERMAN STAPLES SYSTEM CARTIVA IMPLANT. Anesthesia Post Evaluation Multimodal analgesia: multimodal analgesia used between 6 hours prior to anesthesia start to PACU discharge Patient location during evaluation: PACU Patient participation: complete - patient participated Level of consciousness: awake and alert Pain management: adequate Airway patency: patent Anesthetic complications: no 
Cardiovascular status: acceptable and hemodynamically stable Respiratory status: acceptable Hydration status: acceptable Visit Vitals /70 (BP 1 Location: Left arm, BP Patient Position: At rest) Pulse 85 Temp 37.1 °C (98.8 °F) Resp 16 Ht 4' 11\" (1.499 m) Wt 54.4 kg (120 lb) SpO2 96% BMI 24.24 kg/m²

## 2019-01-03 NOTE — H&P
Patient: Shai Spencer MRN: [id number]  SSN: [id number] YOB: 1965  Age: 47 y.o. Sex: female History and Physical 
 
Shai Spencer is a 47 y.o. female having Procedure(s): RIGHT AKIN OSTEOTOMY , Excision of bone 5th metatarsal right RIGHT WITH IMPLANT  FIRST MPJ CHEILECTOMY  GERMAN STAPLES SYSTEM CARTIVA IMPLANT. Allergies: No Known Allergies Chief Complaint: right foot pain History of Present Illness: right foot pain Past Medical History:  
Diagnosis Date  Anemia   
 not currently taking iron supplements; had hysterectomy  Arthritis   
 foot  GERD (gastroesophageal reflux disease)  History of chicken pox  Hypertension   
 pt denies  OAB (overactive bladder) 7/3/2014 Past Surgical History:  
Procedure Laterality Date  HX  SECTION    
 x3  
 HX DILATION AND CURETTAGE    
 HX HYSTERECTOMY  2013 [facility]  
 HX ORTHOPAEDIC    
 right ankle  HX TUBAL LIGATION Family History Problem Relation Age of Onset  Hypertension Father  Diabetes Father  Colon Cancer Father 76  No Known Problems Mother  Malignant Hyperthermia Neg Hx  Pseudocholinesterase Deficiency Neg Hx  Delayed Awakening Neg Hx  Post-op Nausea/Vomiting Neg Hx  Emergence Delirium Neg Hx  Post-op Cognitive Dysfunction Neg Hx   
 Other Neg Hx  Cancer Neg Hx   
 Heart Attack Neg Hx  Breast Cancer Neg Hx   
 Ovarian Cancer Neg Hx Social History Tobacco Use  Smoking status: Current Every Day Smoker Packs/day: 1.00 Years: 35.00 Pack years: 35.00  Smokeless tobacco: Never Used  Tobacco comment: 16 years Substance Use Topics  Alcohol use: Yes Alcohol/week: 0.0 oz  
  Comment: occosaional/social  
  
 
Prior to Admission medications Medication Sig Start Date End Date Taking?  Authorizing Provider  
acetaminophen (TYLENOL) 325 mg tablet Take  by mouth every four (4) hours as needed for Pain. Yes Provider, Historical  
naproxen sodium (ALEVE) 220 mg cap Take  by mouth as needed. Yes Provider, Historical  
ibuprofen (ADVIL) 200 mg tablet Take 200 mg by mouth every eight (8) hours as needed for Pain. Yes Provider, Historical  
  
 
Visit Vitals /70 (BP 1 Location: Left arm, BP Patient Position: At rest) Pulse 85 Temp 37.1 °C (98.8 °F) Resp 16 Ht 4' 11\" (1.499 m) Wt 54.4 kg (120 lb) SpO2 96% BMI 24.24 kg/m² Assessment and Plan:  
Jeanette Amaya is a 47 y.o. female having Procedure(s): RIGHT AKIN OSTEOTOMY , Excision of bone 5th metatarsal right RIGHT WITH IMPLANT  FIRST MPJ CHEILECTOMY  [facility] for right foot pain. Preanesthesia Evaluation No  note exists Signed By: Almita Shankar MD   
 January 2, 2019

## 2019-01-03 NOTE — H&P
Patient: Va Oshea MRN: [id number]  SSN: [id number] YOB: 1965  Age: 47 y.o. Sex: female History and Physical 
 
Va Oshea is a 47 y.o. female having Procedure(s): RIGHT AKIN OSTEOTOMY , Excision of bone 5th metatarsal right RIGHT WITH IMPLANT  FIRST MPJ CHEILECTOMY  GERMAN STAPLES SYSTEM CARTIVA IMPLANT. Allergies: No Known Allergies Chief Complaint: right foot pain History of Present Illness: right foot pain Past Medical History:  
Diagnosis Date  Anemia   
 not currently taking iron supplements; had hysterectomy  Arthritis   
 foot  GERD (gastroesophageal reflux disease)  History of chicken pox  Hypertension   
 pt denies  OAB (overactive bladder) 7/3/2014 Past Surgical History:  
Procedure Laterality Date  HX  SECTION    
 x3  
 HX DILATION AND CURETTAGE    
 HX HYSTERECTOMY  2013 [facility]  
 HX ORTHOPAEDIC    
 right ankle  HX TUBAL LIGATION Family History Problem Relation Age of Onset  Hypertension Father  Diabetes Father  Colon Cancer Father 76  No Known Problems Mother  Malignant Hyperthermia Neg Hx  Pseudocholinesterase Deficiency Neg Hx  Delayed Awakening Neg Hx  Post-op Nausea/Vomiting Neg Hx  Emergence Delirium Neg Hx  Post-op Cognitive Dysfunction Neg Hx   
 Other Neg Hx  Cancer Neg Hx   
 Heart Attack Neg Hx  Breast Cancer Neg Hx   
 Ovarian Cancer Neg Hx Social History Tobacco Use  Smoking status: Current Every Day Smoker Packs/day: 1.00 Years: 35.00 Pack years: 35.00  Smokeless tobacco: Never Used  Tobacco comment: 16 years Substance Use Topics  Alcohol use: Yes Alcohol/week: 0.0 oz  
  Comment: occosaional/social  
  
 
Prior to Admission medications Medication Sig Start Date End Date Taking?  Authorizing Provider  
acetaminophen (TYLENOL) 325 mg tablet Take  by mouth every four (4) hours as needed for Pain. Yes Provider, Historical  
naproxen sodium (ALEVE) 220 mg cap Take  by mouth as needed. Yes Provider, Historical  
ibuprofen (ADVIL) 200 mg tablet Take 200 mg by mouth every eight (8) hours as needed for Pain. Yes Provider, Historical  
  
 
Visit Vitals /70 (BP 1 Location: Left arm, BP Patient Position: At rest) Pulse 85 Temp 37.1 °C (98.8 °F) Resp 16 Ht 4' 11\" (1.499 m) Wt 54.4 kg (120 lb) SpO2 96% BMI 24.24 kg/m² Assessment and Plan:  
Va Oshea is a 47 y.o. female having Procedure(s): RIGHT AKIN OSTEOTOMY , Excision of bone 5th metatarsal right RIGHT WITH IMPLANT  FIRST MPJ CHEILECTOMY  GERMAN [address] IMPLANT for right foot pain. Preanesthesia Evaluation Last edited 12/28/18 0800 by Debi Mclean MD 
Date of Service 12/28/18 0726 Anesthetic History No history of anesthetic complications Review of Systems / Medical History Patient summary reviewed and pertinent labs reviewed Pulmonary Smoker Neuro/Psych Within defined limits Cardiovascular Exercise tolerance: >4 METS 
  
GI/Hepatic/Renal 
  
GERD Endo/Other Arthritis Other Findings Physical Exam 
 
Airway Mallampati: II 
TM Distance: 4 - 6 cm Neck ROM: normal range of motion Mouth opening: Normal 
 
 Cardiovascular Rhythm: regular Rate: normal 
 
 
 
 Dental 
 
 
  
Pulmonary Breath sounds clear to auscultation Abdominal 
 
 
 
 Other Findings Anesthetic Plan ASA: 2 Anesthesia type: general 
 
 
 
 
Induction: Intravenous Anesthetic plan and risks discussed with: Patient and Spouse Preanesthesia evaluation performed by Debi Mclean MD  
  
 
 
 
Signed By: Libia Marquis MD   
 January 2, 2019

## 2019-02-17 ENCOUNTER — HOSPITAL ENCOUNTER (EMERGENCY)
Age: 54
Discharge: HOME | End: 2019-02-17
Attending: EMERGENCY MEDICINE
Payer: COMMERCIAL

## 2019-02-17 VITALS
SYSTOLIC BLOOD PRESSURE: 164 MMHG | DIASTOLIC BLOOD PRESSURE: 89 MMHG | HEIGHT: 59 IN | OXYGEN SATURATION: 99 % | WEIGHT: 120 LBS | RESPIRATION RATE: 16 BRPM | HEART RATE: 84 BPM | TEMPERATURE: 99.1 F | BODY MASS INDEX: 24.19 KG/M2

## 2019-02-17 DIAGNOSIS — L08.0 ECTHYMA: Primary | ICD-10-CM

## 2019-02-17 PROCEDURE — 74011250637 HC RX REV CODE- 250/637: Performed by: EMERGENCY MEDICINE

## 2019-02-17 PROCEDURE — 99283 EMERGENCY DEPT VISIT LOW MDM: CPT | Performed by: EMERGENCY MEDICINE

## 2019-02-17 RX ORDER — MUPIROCIN 20 MG/G
OINTMENT TOPICAL DAILY
Status: DISCONTINUED | OUTPATIENT
Start: 2019-02-17 | End: 2019-02-17 | Stop reason: HOSPADM

## 2019-02-17 RX ORDER — CEPHALEXIN 500 MG/1
500 CAPSULE ORAL 3 TIMES DAILY
Qty: 21 CAP | Refills: 0 | Status: SHIPPED | OUTPATIENT
Start: 2019-02-17 | End: 2019-09-05

## 2019-02-17 RX ORDER — SULFAMETHOXAZOLE AND TRIMETHOPRIM 800; 160 MG/1; MG/1
1 TABLET ORAL 2 TIMES DAILY
Qty: 14 TAB | Refills: 0 | Status: SHIPPED | OUTPATIENT
Start: 2019-02-17 | End: 2019-09-05

## 2019-02-17 RX ADMIN — MUPIROCIN: 20 OINTMENT TOPICAL at 10:29

## 2019-02-17 NOTE — ED NOTES
I have reviewed discharge instructions with the patient. The patient verbalized understanding. Patient left ED via Discharge Method: ambulatory to Home with self. Opportunity for questions and clarification provided. Patient given 2 scripts. To continue your aftercare when you leave the hospital, you may receive an automated call from our care team to check in on how you are doing. This is a free service and part of our promise to provide the best care and service to meet your aftercare needs.  If you have questions, or wish to unsubscribe from this service please call [phone]. Thank you for Choosing our [facility] Emergency Department.

## 2019-02-17 NOTE — ED TRIAGE NOTES
Patient advises having surgery on right foot in December of 2018 and was having difficulty with wound healing and recently started using acticoat flex 3 bandaging about 2 weeks ago and 1 week ago started with itching burning rash to right lower leg. Patient advises wound still is not closed completely. Health Care Proxy (HCP)

## 2019-02-17 NOTE — DISCHARGE INSTRUCTIONS
Clean and dress area 1-2 times daily. Apply thin coat of Bactroban then nonstick dressing. Call your podiatrist and family doctor to consider recheck of this area in 48 hours. Recheck sooner for drainage, high fever or chills. Also consider having her doctor refer you to the wound center if things do not improve with your foot incision. Patient Education        Cellulitis: Care Instructions  Your Care Instructions    Cellulitis is a skin infection caused by bacteria, most often strep or staph. It often occurs after a break in the skin from a scrape, cut, bite, or puncture, or after a rash. Cellulitis may be treated without doing tests to find out what caused it. But your doctor may do tests, if needed, to look for a specific bacteria, like methicillin-resistant Staphylococcus aureus (MRSA). The doctor has checked you carefully, but problems can develop later. If you notice any problems or new symptoms, get medical treatment right away. Follow-up care is a key part of your treatment and safety. Be sure to make and go to all appointments, and call your doctor if you are having problems. It's also a good idea to know your test results and keep a list of the medicines you take. How can you care for yourself at home? · Take your antibiotics as directed. Do not stop taking them just because you feel better. You need to take the full course of antibiotics. · Prop up the infected area on pillows to reduce pain and swelling. Try to keep the area above the level of your heart as often as you can. · If your doctor told you how to care for your wound, follow your doctor's instructions. If you did not get instructions, follow this general advice:  ? Wash the wound with clean water 2 times a day. Don't use hydrogen peroxide or alcohol, which can slow healing. ? You may cover the wound with a thin layer of petroleum jelly, such as Vaseline, and a nonstick bandage. ?  Apply more petroleum jelly and replace the bandage as needed. · Be safe with medicines. Take pain medicines exactly as directed. ? If the doctor gave you a prescription medicine for pain, take it as prescribed. ? If you are not taking a prescription pain medicine, ask your doctor if you can take an over-the-counter medicine. To prevent cellulitis in the future  · Try to prevent cuts, scrapes, or other injuries to your skin. Cellulitis most often occurs where there is a break in the skin. · If you get a scrape, cut, mild burn, or bite, wash the wound with clean water as soon as you can to help avoid infection. Don't use hydrogen peroxide or alcohol, which can slow healing. · If you have swelling in your legs (edema), support stockings and good skin care may help prevent leg sores and cellulitis. · Take care of your feet, especially if you have diabetes or other conditions that increase the risk of infection. Wear shoes and socks. Do not go barefoot. If you have athlete's foot or other skin problems on your feet, talk to your doctor about how to treat them. When should you call for help? Call your doctor now or seek immediate medical care if:    · You have signs that your infection is getting worse, such as:  ? Increased pain, swelling, warmth, or redness. ? Red streaks leading from the area. ? Pus draining from the area. ? A fever.     · You get a rash.    Watch closely for changes in your health, and be sure to contact your doctor if:    · You do not get better as expected. Where can you learn more? Go to http://saurav-allison.info/. [place] in the search box to learn more about \"Cellulitis: Care Instructions. \"  Current as of: April 17, 2018  Content Version: 11.9  © 1618-5721 [employer]. Care instructions adapted under license by [employer] (which disclaims liability or warranty for this information).  If you have questions about a medical condition or this instruction, always ask your healthcare professional. [address] any warranty or liability for your use of this information.

## 2020-11-25 PROBLEM — E78.2 MIXED HYPERLIPIDEMIA: Status: ACTIVE | Noted: 2020-11-25

## 2020-12-10 ENCOUNTER — HOSPITAL ENCOUNTER (OUTPATIENT)
Dept: CT IMAGING | Age: 55
Discharge: HOME | End: 2020-12-10
Attending: INTERNAL MEDICINE
Payer: SELF-PAY

## 2020-12-10 DIAGNOSIS — Z13.6 SCREENING FOR HEART DISEASE: ICD-10-CM

## 2020-12-10 PROCEDURE — 75571 CT HRT W/O DYE W/CA TEST: CPT

## 2020-12-14 NOTE — PROGRESS NOTES
Ca Score came back over 1200 meaning she already has significant CAD. Needs to be on a daily baby Asa and a cholesterol medicine, Crestor 10. Needs to see Cardiology for Ca score of 1200.

## 2020-12-15 NOTE — PROGRESS NOTES
Left message to return call, results letter will be mailed, medication sent to pharmacy, and referral sent to Cardiology.

## 2020-12-31 ENCOUNTER — HOSPITAL ENCOUNTER (OUTPATIENT)
Dept: MAMMOGRAPHY | Age: 55
Discharge: HOME | End: 2020-12-31
Attending: INTERNAL MEDICINE
Payer: COMMERCIAL

## 2020-12-31 DIAGNOSIS — Z12.31 ENCOUNTER FOR SCREENING MAMMOGRAM FOR MALIGNANT NEOPLASM OF BREAST: ICD-10-CM

## 2020-12-31 PROCEDURE — 77067 SCR MAMMO BI INCL CAD: CPT

## 2021-01-04 NOTE — PROGRESS NOTES
Spoke with Michael Jensen she stated that patient needs additional views but they melissa take care of placing the order and contacting the patient as well.

## 2021-01-06 ENCOUNTER — HOSPITAL ENCOUNTER (OUTPATIENT)
Dept: MAMMOGRAPHY | Age: 56
Discharge: HOME | End: 2021-01-06
Attending: INTERNAL MEDICINE
Payer: COMMERCIAL

## 2021-01-06 DIAGNOSIS — R92.8 ABNORMAL SCREENING MAMMOGRAM: ICD-10-CM

## 2021-01-06 PROCEDURE — 77066 DX MAMMO INCL CAD BI: CPT

## 2021-01-06 PROCEDURE — 76642 ULTRASOUND BREAST LIMITED: CPT

## 2021-02-05 ENCOUNTER — HOSPITAL ENCOUNTER (OUTPATIENT)
Dept: LAB | Age: 56
Discharge: HOME | End: 2021-02-05
Payer: COMMERCIAL

## 2021-02-05 DIAGNOSIS — I25.110 CORONARY ARTERY DISEASE INVOLVING NATIVE CORONARY ARTERY OF NATIVE HEART WITH UNSTABLE ANGINA PECTORIS (HCC): ICD-10-CM

## 2021-02-05 LAB
ANION GAP SERPL CALC-SCNC: 2 MMOL/L (ref 7–16)
BASOPHILS # BLD: 0 K/UL (ref 0–0.2)
BASOPHILS NFR BLD: 0 % (ref 0–2)
BUN SERPL-MCNC: 13 MG/DL (ref 6–23)
CALCIUM SERPL-MCNC: 9.1 MG/DL (ref 8.3–10.4)
CHLORIDE SERPL-SCNC: 109 MMOL/L (ref 98–107)
CO2 SERPL-SCNC: 29 MMOL/L (ref 21–32)
CREAT SERPL-MCNC: 0.74 MG/DL (ref 0.6–1)
DIFFERENTIAL METHOD BLD: ABNORMAL
EOSINOPHIL # BLD: 0.1 K/UL (ref 0–0.8)
EOSINOPHIL NFR BLD: 1 % (ref 0.5–7.8)
ERYTHROCYTE [DISTWIDTH] IN BLOOD BY AUTOMATED COUNT: 15.4 % (ref 11.9–14.6)
GLUCOSE SERPL-MCNC: 80 MG/DL (ref 65–100)
HCT VFR BLD AUTO: 38.6 % (ref 35.8–46.3)
HGB BLD-MCNC: 12.3 G/DL (ref 11.7–15.4)
IMM GRANULOCYTES # BLD AUTO: 0 K/UL (ref 0–0.5)
IMM GRANULOCYTES NFR BLD AUTO: 0 % (ref 0–5)
LYMPHOCYTES # BLD: 2.8 K/UL (ref 0.5–4.6)
LYMPHOCYTES NFR BLD: 58 % (ref 13–44)
MCH RBC QN AUTO: 22.9 PG (ref 26.1–32.9)
MCHC RBC AUTO-ENTMCNC: 31.9 G/DL (ref 31.4–35)
MCV RBC AUTO: 72 FL (ref 79.6–97.8)
MONOCYTES # BLD: 0.4 K/UL (ref 0.1–1.3)
MONOCYTES NFR BLD: 7 % (ref 4–12)
NEUTS SEG # BLD: 1.6 K/UL (ref 1.7–8.2)
NEUTS SEG NFR BLD: 34 % (ref 43–78)
NRBC # BLD: 0 K/UL (ref 0–0.2)
PLATELET # BLD AUTO: 186 K/UL (ref 150–450)
PMV BLD AUTO: 11.9 FL (ref 9.4–12.3)
POTASSIUM SERPL-SCNC: 4.3 MMOL/L (ref 3.5–5.1)
RBC # BLD AUTO: 5.36 M/UL (ref 4.05–5.2)
SODIUM SERPL-SCNC: 140 MMOL/L (ref 136–145)
WBC # BLD AUTO: 4.9 K/UL (ref 4.3–11.1)

## 2021-02-05 PROCEDURE — 85025 COMPLETE CBC W/AUTO DIFF WBC: CPT

## 2021-02-05 PROCEDURE — 80048 BASIC METABOLIC PNL TOTAL CA: CPT

## 2021-02-05 PROCEDURE — 36415 COLL VENOUS BLD VENIPUNCTURE: CPT

## 2021-02-18 NOTE — PROGRESS NOTES
Do you currently have any signs or symptoms of respiratory infection, such as fever, cough, shortness of breath, or sore throat? no    In the last 14 days have you had contact with someone with confirmed or presumptive diagnosis of COVID-19 or someone under investigation of COVID-19?  no    In the last 14 days have you traveled or have someone in your home who has traveled to [place], [facility], [facility], [place], [place], [place], [place], or [place]? no

## 2021-02-18 NOTE — PROGRESS NOTES
Patient pre-assessment complete for [facility] scheduled for 2021, arrival time 0600. Patient verified using . Patient instructed to bring all home medications in labeled bottles on the day of procedure. NPO status reinforced. Patient informed to take a full dose aspirin 325mg  or 81 mg x 4 on the day of procedure. Patient instructed to HOLD aleve, chantix. Instructed they can take all other medications excluding vitamins & supplements. Patient verbalizes understanding of all instructions & denies any questions at this time.

## 2021-02-19 ENCOUNTER — HOSPITAL ENCOUNTER (OUTPATIENT)
Dept: CARDIAC CATH/INVASIVE PROCEDURES | Age: 56
Discharge: HOME | End: 2021-02-19
Attending: INTERNAL MEDICINE | Admitting: INTERNAL MEDICINE
Payer: COMMERCIAL

## 2021-02-19 VITALS
HEART RATE: 64 BPM | DIASTOLIC BLOOD PRESSURE: 86 MMHG | OXYGEN SATURATION: 100 % | RESPIRATION RATE: 16 BRPM | WEIGHT: 135 LBS | BODY MASS INDEX: 27.27 KG/M2 | SYSTOLIC BLOOD PRESSURE: 147 MMHG

## 2021-02-19 DIAGNOSIS — I10 ESSENTIAL HYPERTENSION: ICD-10-CM

## 2021-02-19 DIAGNOSIS — I25.119 CORONARY ARTERY DISEASE INVOLVING NATIVE CORONARY ARTERY OF NATIVE HEART WITH ANGINA PECTORIS (HCC): ICD-10-CM

## 2021-02-19 DIAGNOSIS — E78.2 MIXED HYPERLIPIDEMIA: ICD-10-CM

## 2021-02-19 PROBLEM — I25.10 CAD (CORONARY ARTERY DISEASE): Status: ACTIVE | Noted: 2021-02-19

## 2021-02-19 LAB
ATRIAL RATE: 56 BPM
ATRIAL RATE: 60 BPM
ATRIAL RATE: 60 BPM
CALCULATED P AXIS, ECG09: 23 DEGREES
CALCULATED P AXIS, ECG09: 31 DEGREES
CALCULATED P AXIS, ECG09: 68 DEGREES
CALCULATED R AXIS, ECG10: 33 DEGREES
CALCULATED R AXIS, ECG10: 36 DEGREES
CALCULATED R AXIS, ECG10: 48 DEGREES
CALCULATED T AXIS, ECG11: 49 DEGREES
CALCULATED T AXIS, ECG11: 52 DEGREES
CALCULATED T AXIS, ECG11: 60 DEGREES
DIAGNOSIS, 93000: NORMAL
P-R INTERVAL, ECG05: 170 MS
P-R INTERVAL, ECG05: 188 MS
P-R INTERVAL, ECG05: 190 MS
Q-T INTERVAL, ECG07: 426 MS
Q-T INTERVAL, ECG07: 428 MS
Q-T INTERVAL, ECG07: 430 MS
QRS DURATION, ECG06: 68 MS
QRS DURATION, ECG06: 72 MS
QRS DURATION, ECG06: 74 MS
QTC CALCULATION (BEZET), ECG08: 413 MS
QTC CALCULATION (BEZET), ECG08: 426 MS
QTC CALCULATION (BEZET), ECG08: 430 MS
VENTRICULAR RATE, ECG03: 56 BPM
VENTRICULAR RATE, ECG03: 60 BPM
VENTRICULAR RATE, ECG03: 60 BPM

## 2021-02-19 PROCEDURE — C1887 CATHETER, GUIDING: HCPCS

## 2021-02-19 PROCEDURE — 93572 IV DOP VEL&/PRESS C FLO EA: CPT

## 2021-02-19 PROCEDURE — 77030016699 HC CATH ANGI DX INFN1 CARD -A

## 2021-02-19 PROCEDURE — 74011000258 HC RX REV CODE- 258: Performed by: INTERNAL MEDICINE

## 2021-02-19 PROCEDURE — 93458 L HRT ARTERY/VENTRICLE ANGIO: CPT | Performed by: INTERNAL MEDICINE

## 2021-02-19 PROCEDURE — 99152 MOD SED SAME PHYS/QHP 5/>YRS: CPT | Performed by: INTERNAL MEDICINE

## 2021-02-19 PROCEDURE — 74011000250 HC RX REV CODE- 250: Performed by: INTERNAL MEDICINE

## 2021-02-19 PROCEDURE — 85347 COAGULATION TIME ACTIVATED: CPT

## 2021-02-19 PROCEDURE — 92928 PRQ TCAT PLMT NTRAC ST 1 LES: CPT | Performed by: INTERNAL MEDICINE

## 2021-02-19 PROCEDURE — 99156 MOD SED OTH PHYS/QHP 5/>YRS: CPT

## 2021-02-19 PROCEDURE — 93005 ELECTROCARDIOGRAM TRACING: CPT | Performed by: INTERNAL MEDICINE

## 2021-02-19 PROCEDURE — 92928 PRQ TCAT PLMT NTRAC ST 1 LES: CPT

## 2021-02-19 PROCEDURE — C1725 CATH, TRANSLUMIN NON-LASER: HCPCS

## 2021-02-19 PROCEDURE — 93571 IV DOP VEL&/PRESS C FLO 1ST: CPT | Performed by: INTERNAL MEDICINE

## 2021-02-19 PROCEDURE — 77030015766

## 2021-02-19 PROCEDURE — 77030042317 HC BND COMPR HEMSTAT -B

## 2021-02-19 PROCEDURE — C1769 GUIDE WIRE: HCPCS

## 2021-02-19 PROCEDURE — 74011250636 HC RX REV CODE- 250/636: Performed by: INTERNAL MEDICINE

## 2021-02-19 PROCEDURE — 93571 IV DOP VEL&/PRESS C FLO 1ST: CPT

## 2021-02-19 PROCEDURE — C1894 INTRO/SHEATH, NON-LASER: HCPCS

## 2021-02-19 PROCEDURE — 93458 L HRT ARTERY/VENTRICLE ANGIO: CPT

## 2021-02-19 PROCEDURE — 99153 MOD SED SAME PHYS/QHP EA: CPT

## 2021-02-19 PROCEDURE — 99152 MOD SED SAME PHYS/QHP 5/>YRS: CPT

## 2021-02-19 PROCEDURE — 74011250637 HC RX REV CODE- 250/637: Performed by: INTERNAL MEDICINE

## 2021-02-19 PROCEDURE — 74011000636 HC RX REV CODE- 636: Performed by: INTERNAL MEDICINE

## 2021-02-19 PROCEDURE — 77030012468 HC VLV BLEEDBK CNTRL ABBT -B

## 2021-02-19 PROCEDURE — C1874 STENT, COATED/COV W/DEL SYS: HCPCS

## 2021-02-19 RX ORDER — GUAIFENESIN 100 MG/5ML
81 LIQUID (ML) ORAL DAILY
Status: CANCELLED | OUTPATIENT
Start: 2021-02-19

## 2021-02-19 RX ORDER — ROSUVASTATIN CALCIUM 20 MG/1
20 TABLET, COATED ORAL
Qty: 90 TAB | Refills: 3 | Status: SHIPPED | OUTPATIENT
Start: 2021-02-19 | End: 2022-03-03 | Stop reason: SDUPTHER

## 2021-02-19 RX ORDER — MORPHINE SULFATE 2 MG/ML
2 INJECTION, SOLUTION INTRAMUSCULAR; INTRAVENOUS
Status: CANCELLED | OUTPATIENT
Start: 2021-02-19

## 2021-02-19 RX ORDER — RANOLAZINE 500 MG/1
500 TABLET, EXTENDED RELEASE ORAL 2 TIMES DAILY
Qty: 60 TAB | Refills: 11 | Status: SHIPPED | OUTPATIENT
Start: 2021-02-19 | End: 2021-02-19 | Stop reason: SDUPTHER

## 2021-02-19 RX ORDER — SODIUM CHLORIDE 0.9 % (FLUSH) 0.9 %
5-40 SYRINGE (ML) INJECTION EVERY 8 HOURS
Status: CANCELLED | OUTPATIENT
Start: 2021-02-19

## 2021-02-19 RX ORDER — FENTANYL CITRATE 50 UG/ML
25-50 INJECTION, SOLUTION INTRAMUSCULAR; INTRAVENOUS
Status: DISCONTINUED | OUTPATIENT
Start: 2021-02-19 | End: 2021-02-19 | Stop reason: HOSPADM

## 2021-02-19 RX ORDER — HEPARIN SODIUM 10000 [USP'U]/ML
1000-10000 INJECTION, SOLUTION INTRAVENOUS; SUBCUTANEOUS
Status: DISCONTINUED | OUTPATIENT
Start: 2021-02-19 | End: 2021-02-19

## 2021-02-19 RX ORDER — ISOSORBIDE MONONITRATE 60 MG/1
60 TABLET, EXTENDED RELEASE ORAL DAILY
Qty: 180 TAB | Refills: 3 | OUTPATIENT
Start: 2021-02-19 | End: 2021-02-19 | Stop reason: SDUPTHER

## 2021-02-19 RX ORDER — ISOSORBIDE MONONITRATE 60 MG/1
60 TABLET, EXTENDED RELEASE ORAL DAILY
Qty: 90 TAB | Refills: 3 | Status: SHIPPED | OUTPATIENT
Start: 2021-02-19 | End: 2022-03-03 | Stop reason: SDUPTHER

## 2021-02-19 RX ORDER — SODIUM CHLORIDE 0.9 % (FLUSH) 0.9 %
5-40 SYRINGE (ML) INJECTION AS NEEDED
Status: CANCELLED | OUTPATIENT
Start: 2021-02-19

## 2021-02-19 RX ORDER — LIDOCAINE HYDROCHLORIDE 10 MG/ML
1-30 INJECTION, SOLUTION EPIDURAL; INFILTRATION; INTRACAUDAL; PERINEURAL ONCE
Status: COMPLETED | OUTPATIENT
Start: 2021-02-19 | End: 2021-02-19

## 2021-02-19 RX ORDER — ROSUVASTATIN CALCIUM 10 MG/1
20 TABLET, COATED ORAL
Qty: 90 TAB | Refills: 1 | Status: SHIPPED | OUTPATIENT
Start: 2021-02-19 | End: 2021-02-19 | Stop reason: SDUPTHER

## 2021-02-19 RX ORDER — LORAZEPAM 1 MG/1
1 TABLET ORAL
Status: CANCELLED | OUTPATIENT
Start: 2021-02-19

## 2021-02-19 RX ORDER — SODIUM CHLORIDE 9 MG/ML
75 INJECTION, SOLUTION INTRAVENOUS CONTINUOUS
Status: CANCELLED | OUTPATIENT
Start: 2021-02-19 | End: 2021-02-19

## 2021-02-19 RX ORDER — MIDAZOLAM HYDROCHLORIDE 1 MG/ML
.5-2 INJECTION, SOLUTION INTRAMUSCULAR; INTRAVENOUS
Status: DISCONTINUED | OUTPATIENT
Start: 2021-02-19 | End: 2021-02-19 | Stop reason: HOSPADM

## 2021-02-19 RX ORDER — NITROGLYCERIN 0.4 MG/1
0.4 TABLET SUBLINGUAL
Status: CANCELLED | OUTPATIENT
Start: 2021-02-19

## 2021-02-19 RX ORDER — SODIUM CHLORIDE 9 MG/ML
75 INJECTION, SOLUTION INTRAVENOUS CONTINUOUS
Status: DISCONTINUED | OUTPATIENT
Start: 2021-02-19 | End: 2021-02-19 | Stop reason: HOSPADM

## 2021-02-19 RX ORDER — ISOSORBIDE MONONITRATE 60 MG/1
60 TABLET, EXTENDED RELEASE ORAL DAILY
Qty: 30 TAB | Refills: 11 | Status: SHIPPED | OUTPATIENT
Start: 2021-02-19 | End: 2021-02-19 | Stop reason: SDUPTHER

## 2021-02-19 RX ORDER — GUAIFENESIN 100 MG/5ML
324-325 LIQUID (ML) ORAL ONCE
Status: COMPLETED | OUTPATIENT
Start: 2021-02-19 | End: 2021-02-19

## 2021-02-19 RX ORDER — RANOLAZINE 500 MG/1
500 TABLET, EXTENDED RELEASE ORAL 2 TIMES DAILY
Qty: 60 TAB | Refills: 11 | Status: SHIPPED | OUTPATIENT
Start: 2021-02-19

## 2021-02-19 RX ORDER — HEPARIN SODIUM 200 [USP'U]/100ML
3 INJECTION, SOLUTION INTRAVENOUS CONTINUOUS
Status: DISCONTINUED | OUTPATIENT
Start: 2021-02-19 | End: 2021-02-19 | Stop reason: HOSPADM

## 2021-02-19 RX ORDER — RANOLAZINE 500 MG/1
500 TABLET, EXTENDED RELEASE ORAL 2 TIMES DAILY
Qty: 60 TAB | Refills: 11 | OUTPATIENT
Start: 2021-02-19 | End: 2021-02-19 | Stop reason: SDUPTHER

## 2021-02-19 RX ORDER — ISOSORBIDE MONONITRATE 60 MG/1
60 TABLET, EXTENDED RELEASE ORAL DAILY
Qty: 90 TAB | Refills: 3 | OUTPATIENT
Start: 2021-02-19 | End: 2021-02-19 | Stop reason: SDUPTHER

## 2021-02-19 RX ORDER — ACETAMINOPHEN 325 MG/1
650 TABLET ORAL
Status: CANCELLED | OUTPATIENT
Start: 2021-02-19

## 2021-02-19 RX ORDER — ROSUVASTATIN CALCIUM 20 MG/1
20 TABLET, COATED ORAL
Qty: 90 TAB | Refills: 3 | OUTPATIENT
Start: 2021-02-19 | End: 2021-02-19 | Stop reason: SDUPTHER

## 2021-02-19 RX ADMIN — FENTANYL CITRATE 25 MCG: 50 INJECTION, SOLUTION INTRAMUSCULAR; INTRAVENOUS at 08:25

## 2021-02-19 RX ADMIN — NITROGLYCERIN 0.2 MG: 200 INJECTION, SOLUTION INTRAVENOUS at 08:43

## 2021-02-19 RX ADMIN — FENTANYL CITRATE 25 MCG: 50 INJECTION, SOLUTION INTRAMUSCULAR; INTRAVENOUS at 08:15

## 2021-02-19 RX ADMIN — NITROGLYCERIN 0.2 MG: 200 INJECTION, SOLUTION INTRAVENOUS at 08:57

## 2021-02-19 RX ADMIN — TICAGRELOR 180 MG: 90 TABLET ORAL at 09:02

## 2021-02-19 RX ADMIN — HEPARIN SODIUM 2 ML: 10000 INJECTION, SOLUTION INTRAVENOUS; SUBCUTANEOUS at 08:25

## 2021-02-19 RX ADMIN — IOPAMIDOL 180 ML: 755 INJECTION, SOLUTION INTRAVENOUS at 09:00

## 2021-02-19 RX ADMIN — LIDOCAINE HYDROCHLORIDE 4 ML: 10 INJECTION, SOLUTION EPIDURAL; INFILTRATION; INTRACAUDAL; PERINEURAL at 08:23

## 2021-02-19 RX ADMIN — HEPARIN SODIUM 3 UNITS/HR: 200 INJECTION, SOLUTION INTRAVENOUS at 08:17

## 2021-02-19 RX ADMIN — FENTANYL CITRATE 25 MCG: 50 INJECTION, SOLUTION INTRAMUSCULAR; INTRAVENOUS at 08:24

## 2021-02-19 RX ADMIN — MIDAZOLAM 1 MG: 1 INJECTION INTRAMUSCULAR; INTRAVENOUS at 08:15

## 2021-02-19 RX ADMIN — FENTANYL CITRATE 25 MCG: 50 INJECTION, SOLUTION INTRAMUSCULAR; INTRAVENOUS at 09:09

## 2021-02-19 RX ADMIN — MIDAZOLAM 1 MG: 1 INJECTION INTRAMUSCULAR; INTRAVENOUS at 08:24

## 2021-02-19 RX ADMIN — SODIUM CHLORIDE 75 ML/HR: 900 INJECTION, SOLUTION INTRAVENOUS at 07:02

## 2021-02-19 RX ADMIN — BIVALIRUDIN 1.75 MG/KG/HR: 250 INJECTION, POWDER, LYOPHILIZED, FOR SOLUTION INTRAVENOUS at 08:39

## 2021-02-19 RX ADMIN — ASPIRIN 81 MG 324 MG: 81 TABLET ORAL at 05:00

## 2021-02-19 NOTE — DISCHARGE INSTRUCTIONS
Patient Education   Patient Education   Patient Education   Ticagrelor (By mouth)   Ticagrelor ([id number])  Helps prevent stroke, heart attack, and other heart problems. This medicine is a blood thinner. Brand Name(s): Alonsorony   There may be other brand names for this medicine. When This Medicine Should Not Be Used: This medicine is not right for everyone. Do not use it if you had an allergic reaction to ticagrelor, or if you have bleeding problems (such as a bleeding stomach ulcer) or a history of bleeding in your brain. How to Use This Medicine:   Tablet  · Your doctor will tell you how much medicine to use. Do not use more than directed. Take this medicine at the same time every day. · Your doctor may tell you to take aspirin with this medicine. Do not use more than 100 milligrams of aspirin per day. Check the labels of other medicines to make sure they do not contain aspirin. · If you cannot swallow the tablet, you may do this:   ¨ Crush the tablet and mix it in a glass of water. Drink it right away. Rinse the glass with more water and drink that too, so you get all the medicine. ¨ You may give the tablet and water mixture through a nasogastric tube. Flush the tube with more water so you receive all the medicine. · This medicine should come with a Medication Guide. Ask your pharmacist for a copy if you do not have one. · Missed dose: Skip the missed dose and take your next dose as usual. Do not take extra medicine to make up for a missed dose. · Store the medicine in a closed container at room temperature, away from heat, moisture, and direct light. Drugs and Foods to Avoid:   Ask your doctor or pharmacist before using any other medicine, including over-the-counter medicines, vitamins, and herbal products. · Some medicines can affect how ticagrelor works.  Tell your doctor if you are using any of the following:  ¨ Atazanavir, carbamazepine, clarithromycin, digoxin, indinavir, itraconazole, ketoconazole, lovastatin, nefazodone, nelfinavir, phenobarbital, phenytoin, rifampin, ritonavir, saquinavir, simvastatin, telithromycin, or voriconazole  ¨ Blood thinner (including warfarin or heparin)  ¨ NSAID pain or arthritis medicine (including celecoxib, diclofenac, ibuprofen, naproxen)  Warnings While Using This Medicine:   · Tell your doctor if you are pregnant or breastfeeding, or if you have liver disease, heart rhythm problems (including slow heartbeat), lung or breathing problems (such as asthma or COPD), or a history of bleeding problems. · This medicine may cause you to bleed and bruise more easily, and it may take longer than usual for bleeding to stop. Be careful to avoid injuries. · Do not stop using this medicine unless your doctor tells you to. To stop it may increase your risk of a heart attack, blood clot, or other serious problem. · Tell any doctor or dentist who treats you that you are using this medicine. With your doctor's permission, you may need to stop using this medicine several days before you have surgery to reduce the risk of bleeding problems. Follow your doctor's instructions carefully. · Your doctor will do lab tests at regular visits to check on the effects of this medicine. Keep all appointments. · Keep all medicine out of the reach of children. Never share your medicine with anyone. Possible Side Effects While Using This Medicine:   Call your doctor right away if you notice any of these side effects:  · Allergic reaction: Itching or hives, swelling in your face or hands, swelling or tingling in your mouth or throat, chest tightness, trouble breathing  · Bloody or black, tarry stools, red or dark brown urine  · Fast, slow, or pounding heartbeat  · Trouble breathing  · Unusual bleeding, bruising, or weakness  · Vomiting of blood or material that looks like coffee grounds  If you notice other side effects that you think are caused by this medicine, tell your doctor.    Call your doctor for medical advice about side effects. You may report side effects to FDA at 6-954-ZCK-9734  © 2017 [facility] Information is for End User's use only and may not be sold, redistributed or otherwise used for commercial purposes. The above information is an  only. It is not intended as medical advice for individual conditions or treatments. Talk to your doctor, nurse or pharmacist before following any medical regimen to see if it is safe and effective for you. Learning About Eating More Fruits and Vegetables  What are some quick tips for eating more fruits and vegetables? We're all encouraged to eat more fruits and vegetables. Yet it can seem like one more chore on the daily to-do list. But you can add color and crunch to your meals--and lots of nutrition--with these quick tips. · Brighten up your breakfast.  ? Add sliced fruit or frozen berries to your yogurt, pancakes, or cereal.  ? Blend fresh or frozen fruit, veggies, and yogurt with a little fruit juice, and you've got a tasty smoothie. ? Make your scrambled eggs a gourmet treat by adding onions, celery, and bell peppers. ? Bake up some bran muffins with grated carrots added into the mix. · Make a livelier lunch. ? Jazz up tuna or chicken salad with apple chunks, celery, or grapes--or all of them! ? Add cucumbers, avocado slices, tomatoes, and lettuce to your sandwiches. ? Kick up the flavor of grilled cheese sandwiches with spinach and tomatoes. ? Puree some potatoes or squash to add to tomato soup. · Add delicious veggies to dinner. ? Give more color and taste to salads. Stir in red cabbage, carrots, and bell peppers. Top salads with dried cranberries or raisins. \"Frost\" your salad with orange sections or strawberries. ? Keep a bag or two of frozen vegetables ready to pull out of the freezer for a side dish. ? Spice up spaghetti and meatballs with mushrooms and bell peppers.   ? Roast vegetables like cauliflower or squash in the oven with olive oil to bring out their flavor. ? Season your veggie dish with herbs like basil and homero and a splash of lemon juice and olive oil. ? If you've got a main dish in the oven, stick in a potato to round out your meal.  · Grab some healthy snacks on the go. ? Scoop up an apple, banana, or plum for a quick snack. ? Cut up raw fruits and veggies to keep in your fridge. Grapes, oranges, carrots, and celery are great choices. They'll be ready for a quick snack or an after-school treat. ? Dip raw vegetables in hummus or peanut butter. ? Keep dried fruit on hand for an easy \"take with you\" snack. · Make something sweet--and healthy. ? Try baked apples or pears topped with cinnamon and honey for a delicious dessert. ? Make chocolate chip cookies even better with grated carrots added to the mix. Where can you learn more? Go to http://www.gray.com/  Enter F050 in the search box to learn more about \"Learning About Eating More Fruits and Vegetables. \"  Current as of: August 22, 2019               Content Version: 12.6  © 9918-4297 [employer]. Care instructions adapted under license by [employer] (which disclaims liability or warranty for this information). If you have questions about a medical condition or this instruction, always ask your healthcare professional. [place] [address] any warranty or liability for your use of this information. Learning About Coronary Angioplasty  What is a coronary angioplasty? Coronary angioplasty is the name for procedures to open a blocked coronary artery. This also may be called percutaneous coronary intervention (PCI). An angioplasty is a way to open a blocked coronary artery before, during, or after a heart attack. It gets blood flowing to your heart.  And it can help prevent heart problems by widening an artery that has been narrowed by fatty buildup (plaque). How is the procedure done? Coronary angioplasty is done in a cardiac catheterization laboratory (\"cath lab\"). It is done by a heart specialist called a cardiologist. The whole procedure may take 1½ to 3 hours. You lie on a table under a large X-ray machine. You will get medicine through an IV in one of your veins. It helps you relax and not feel pain. You will be awake during the procedure. But you may not be able to remember much about it. The doctor will inject some medicine into your arm or groin to numb the skin. You will feel a small needle poke you. It's like having a blood test. You may feel some pressure when the doctor puts in the catheter. But you will not feel pain. The doctor will look at X-ray pictures on a monitor (like a TV screen) to move the catheter to your heart. The doctor then puts a dye into the catheter. This makes your heart's arteries show up on a screen. The doctor can then see any arteries that are blocked or narrowed. You may feel warm or flushed for a short time when the doctor injects dye into your artery. If you have a blocked or narrow artery, the doctor uses a catheter with a tiny balloon at the tip. The doctor puts it into the blocked or narrow area and inflates it. The balloon presses the fatty buildup against the walls of the artery. This buildup is called plaque. This creates more room for blood to flow. In most cases, the doctor then puts a stent in the artery. A stent is a small, wire-mesh tube. It presses against the walls of the artery. The stent is left in the artery to keep the artery open. This helps blood flow. What happens right after the procedure? The catheter will be removed. A nurse or doctor may press on a bandage on the opening. Then a bandage or a compression device may be placed on your groin or wrist at the catheter insertion site. This prevents bleeding.  After the procedure, you will be taken to a room where the catheter site and your heart rate, blood pressure, and temperature will be checked several times. If the catheter was put in your groin, you will need to lie still and keep your leg straight for several hours. If the catheter was put in your arm, you may be able to sit up and get out of bed right away. But you will need to keep your arm still for at least 1 hour. You may stay 1 night in the hospital. When you go home, you will get instructions from your doctor to help you recover well and prevent problems. Make sure to drink plenty of fluids (unless your doctor tells you not to) for several hours after the procedure. This will help flush the dye out of your body. Follow-up care is a key part of your treatment and safety. Be sure to make and go to all appointments, and call your doctor if you are having problems. It's also a good idea to know your test results and keep a list of the medicines you take. Where can you learn more? Go to http://www.gray.com/  Enter M058 in the search box to learn more about \"Learning About Coronary Angioplasty. \"  Current as of: December 16, 2019               Content Version: 12.6  © 6132-7980 [id number]. Care instructions adapted under license by Network (which disclaims liability or warranty for this information). If you have questions about a medical condition or this instruction, always ask your healthcare professional. [address] any warranty or liability for your use of this information. POST PCI/STENT DISCHARGE INSTRUCTIONS    1. Check puncture site frequently for swelling or bleeding. If there is any bleeding, lie down and apply pressure over the area with a clean towel or washcloth and call 911. Notify your doctor for any redness, swelling, drainage, or oozing from the puncture site. Notify your doctor for any fever or chills.       2. If the extremity becomes cold, numb, or painful call [facility] at [phone].    3. Activity should be limited for the next 48-72 hours. No heavy lifting (anything over 10 pounds) for 3 days. No pushing or pulling with right arm for the next three days. 4.  You may resume your usual diet. Drink more fluids than usual.    5.  Have a responsible person drive you home and stay with you for at least 24 hours after your heart catheterization/angiography. No driving for 24 hours. 6. You may remove bandage from your right arm in 24 hours. You may shower in 24 hours. No tub baths, hot tubs, or swimming for 1 week. Do not place any lotions, creams, powders, or ointments over puncture site for 1 week. You may place a clean band-aid over the puncture site each day for 5 days. Change daily. YOU ARE BEING DISCHARGED ON TWO ANTI-PLATELET MEDICATIONS    1- Brilinta 90 mg every 12 hours    2- Aspirin 81 mg daily    THESE MEDICATIONS  ARE VERY IMPORTANT TO YOUR RECOVERY AND  MUST BE TAKEN EXACTLY AS PRESCRIBED & NOT STOPPED FOR ANY REASON. THESE MAY ONLY BE STOPPED WITH AN ORDER FROM YOUR CARDIOLOGIST. PLEASE HAVE THESE FILLED IMMEDIATELY AND START TAKING Brilinta tonight    YOU ARE ALSO BEING DISCHARGED ON A MEDICATION FOR CHOLESTEROL MANAGEMENT. 1- Crestor 20 mg every evening      You have also been referred to [facility]. Someone from Cardiac Rehab will be calling you to set up a follow up appointment. If they have not called you within a week,  please call [phone]. Percutaneous Coronary Intervention: What to Expect at [facility]     Percutaneous coronary intervention (PCI) is the name for procedures that are used to open a narrowed or blocked coronary artery. The two most common PCI procedures are coronary angioplasty and coronary stent placement. Your groin or arm may have a bruise and feel sore for a day or two after a percutaneous coronary intervention (PCI).  You can do light activities around the house, but nothing strenuous for several days. This care sheet gives you a general idea about how long it will take for you to recover. But each person recovers at a different pace. Follow the steps below to get better as quickly as possible. How can you care for yourself at home? Activity  · Do not do strenuous exercise and do not lift, pull, or push anything heavy until your doctor says it is okay. This may be for a day or two. You can walk around the house and do light activity, such as cooking. · You may shower 24 to 48 hours after the procedure, if your doctor okays it. Pat the incision dry. Do not take a bath for 1 week, or until your doctor tells you it is okay. · If the catheter was placed in your groin, try not to walk up stairs for the first couple of days. · If the catheter was placed in your arm near your wrist, do not bend your wrist deeply for the first couple of days. Be careful using your hand to get into and out of a chair or bed. · If your doctor recommends it, get more exercise. Walking is a good choice. Bit by bit, increase the amount you walk every day. Try for at least 30 minutes on most days of the week. Diet  · Drink plenty of fluids to help your body flush out the dye. If you have kidney, heart, or liver disease and have to limit fluids, talk with your doctor before you increase the amount of fluids you drink. · Keep eating a heart-healthy diet that has lots of fruits, vegetables, and whole grains. If you have not been eating this way, talk to your doctor. You also may want to talk to a dietitian. This expert can help you to learn about healthy foods and plan meals. Medicines  · Your doctor will tell you if and when you can restart your medicines. He or she will also give you instructions about taking any new medicines. · If you take blood thinners, such as warfarin (Coumadin), clopidogrel (Plavix), or aspirin, be sure to talk to your doctor.  He or she will tell you if and when to start taking those medicines again. Make sure that you understand exactly what your doctor wants you to do. · Your doctor will prescribe blood-thinning medicines. You will likely take aspirin plus another antiplatelet, such as clopidogrel (Plavix). It is very important that you take these medicines exactly as directed. These medicines help keep the coronary artery open and reduce your risk of a heart attack. · Call your doctor if you think you are having a problem with your medicine. Care of the catheter site  · For 1 or 2 days, keep a bandage over the spot where the catheter was inserted. The bandage probably will fall off in this time. · Put ice or a cold pack on the area for 10 to 20 minutes at a time to help with soreness or swelling. Put a thin cloth between the ice and your skin. Follow-up care is a key part of your treatment and safety. Be sure to make and go to all appointments, and call your doctor if you are having problems. It's also a good idea to know your test results and keep a list of the medicines you take. When should you call for help? Call 911 anytime you think you may need emergency care. For example, call if:  · You passed out (lost consciousness). · You have severe trouble breathing. · You have sudden chest pain and shortness of breath, or you cough up blood. · You have symptoms of a heart attack, such as:  ¨ Chest pain or pressure. ¨ Sweating. ¨ Shortness of breath. ¨ Nausea or vomiting. ¨ Pain that spreads from the chest to the neck, jaw, or one or both shoulders or arms. ¨ Dizziness or lightheadedness. ¨ A fast or uneven pulse. After calling 911, chew 1 adult-strength aspirin. Wait for an ambulance. Do not try to drive yourself. · You have been diagnosed with angina, and you have angina symptoms that do not go away with rest or are not getting better within 5 minutes after you take one dose of nitroglycerin.   Call your doctor now or seek immediate medical care if:  · You are bleeding from the area where the catheter was put in your artery. · You have a fast-growing, painful lump at the catheter site. · You have signs of infection, such as:  ¨ Increased pain, swelling, warmth, or redness. ¨ Red streaks leading from the catheter site. ¨ Pus draining from the catheter site. ¨ A fever. · Your leg or arm looks blue or feels cold, numb, or tingly. Watch closely for changes in your health, and be sure to contact your doctor if you have any problems. Where can you learn more? Go to [employer]  Enter L056 in the search box to learn more about \"Percutaneous Coronary Intervention: What to Expect at Home. \"   © 1191-8602 [employer]wise, [employer]. Care instructions adapted under license by Karie Benjamin (which disclaims liability or warranty for this information). This care instruction is for use with your licensed healthcare professional. If you have questions about a medical condition or this instruction, always ask your healthcare professional. [place] [address] any warranty or liability for your use of this information. Content Version: 18.7.794736; Current as of: May 22, 2015         Cardiac Rehabilitation: Care Instructions  Your Care Instructions    Cardiac rehabilitation is a program for people who have a heart problem, such as a heart attack, heart failure, or a heart valve disease. The program includes exercise, lifestyle changes, education, and emotional support. Cardiac rehab can help you improve the quality of your life through better overall health. It can help you lose weight and feel better about yourself. On your cardiac rehab team, you may have your doctor, a nurse specialist, a dietitian, and a physical therapist. They will design your cardiac rehab program specifically for you. You will learn how to reduce your risk for heart problems, how to manage stress, and how to eat a heart-healthy diet.  By the end of the program, you will be ready to maintain a healthier lifestyle on your own. Follow-up care is a key part of your treatment and safety. Be sure to make and go to all appointments, and call your doctor if you are having problems. It's also a good idea to know your test results and keep a list of the medicines you take. How can you care for yourself at home? · Take your medicines exactly as prescribed. Call your doctor if you think you are having a problem with your medicine. You will get more details on the specific medicines your doctor prescribes. · Weigh yourself every day if your doctor tells you to. Watch for sudden weight gain. Weigh yourself on the same scale with the same amount of clothing at the same time of day. · Plan your meals so that you are eating heart-healthy foods. ¨ Eat a variety of foods daily. Fresh fruits and vegetables and whole-grains are good choices. ¨ Limit your fat intake, especially saturated and trans fat. ¨ Limit salt (sodium). ¨ Increase fiber in your diet. ¨ Limit alcohol. · Learn how to take your pulse so that you can track your heart rate during exercise. · Always check with your doctor before you begin a new exercise program.  · Warm up before you exercise and cool down afterward for at least 15 minutes each. This will help your heart gradually prepare for and recover from exercise and avoid pushing your heart too hard. · Stop exercising if you have any unusual discomfort, such as chest pain. · Do not smoke. Smoking can make heart problems worse. If you need help quitting, talk to your doctor about stop-smoking programs and medicines. These can increase your chances of quitting for good. When should you call for help? Call 911 anytime you think you may need emergency care. For example, call if:  · You have severe trouble breathing. · You cough up pink, foamy mucus and you have trouble breathing. · You have symptoms of a heart attack.  These may include:  ¨ Chest pain or pressure, or a strange feeling in the chest.  ¨ Sweating. ¨ Shortness of breath. ¨ Nausea or vomiting. ¨ Pain, pressure, or a strange feeling in the back, neck, jaw, or upper belly or in one or both shoulders or arms. ¨ Lightheadedness or sudden weakness. ¨ A fast or irregular heartbeat. After you call 911, the  may tell you to chew 1 adult-strength or 2 to 4 low-dose aspirin. Wait for an ambulance. Do not try to drive yourself. · You have angina symptoms (such as chest pain or pressure) that do not go away with rest or are not getting better within 5 minutes after you take a dose of nitroglycerin. · You have symptoms of a stroke. These may include:  ¨ Sudden numbness, tingling, weakness, or loss of movement in your face, arm, or leg, especially on only one side of your body. ¨ Sudden vision changes. ¨ Sudden trouble speaking. ¨ Sudden confusion or trouble understanding simple statements. ¨ Sudden problems with walking or balance. ¨ A sudden, severe headache that is different from past headaches. · You passed out (lost consciousness). Call your doctor now or seek immediate medical care if:  · You have new or increased shortness of breath. · You are dizzy or lightheaded, or you feel like you may faint. · You gain weight suddenly, such as 3 pounds or more in 2 to 3 days. (Your doctor may suggest a different range of weight gain.)  · You have increased swelling in your legs, ankles, or feet. Watch closely for changes in your health, and be sure to contact your doctor if you have any problems. Where can you learn more? Go to http://www.gray.com/. Enter X910 in the search box to learn more about \"Cardiac Rehabilitation: Care Instructions. \"  Current as of: May 5, 2016  Content Version: 11.1  © 4350-9162 [employer]. Care instructions adapted under license by [employer] (which disclaims liability or warranty for this information).  If you have questions about a medical condition or this instruction, always ask your healthcare professional. [place] [address] any warranty or liability for your use of this information. [employer] Activation    Thank you for requesting access to [employer]. Please follow the instructions below to securely access and download your online medical record. [employer]ox allows you to send messages to your doctor, view your test results, renew your prescriptions, schedule appointments, and more. How Do I Sign Up? 1. In your internet browser, go to [email or web address]. 2. Click on the First Time User? Click Here link in the Sign In box. You will see the New Member Sign Up page. 3. Enter your [employer] Access Code exactly as it appears below. You will not need to use this code after youve completed the sign-up process. If you do not sign up before the expiration date, you must request a new code. [employer] Access Code: [email or web address]  Expires: 2021  5:49 AM (This is the date your Care[employer] access code will )    4. Enter the last four digits of your Social Security Number (xxxx) and Date of Birth (mm/dd/yyyy) as indicated and click Submit. You will be taken to the next sign-up page. 5. Create a CareDox ID. This will be your CareDox login ID and cannot be changed, so think of one that is secure and easy to remember. 6. Create a CareDox password. You can change your password at any time. 7. Enter your Password Reset Question and Answer. This can be used at a later time if you forget your password. 8. Enter your e-mail address. You will receive e-mail notification when new information is available in [address] 9. Click Sign Up. You can now view and download portions of your medical record. 10. Click the Download Summary menu link to download a portable copy of your medical information.     Additional Information    If you have questions, please visit the Frequently Asked Questions section of the [employer] website at [email or web address] Remember, [facility] is NOT to be used for urgent needs. For medical emergencies, dial 911.

## 2021-02-19 NOTE — PROGRESS NOTES
Patient received to [address] [place] [address] room # 10  Ambulatory from [facility]. Patient scheduled for C today with Dr Camelia Landau. Procedure reviewed & questions answered, voiced good understanding consent obtained & placed on chart. All medications and medical history reviewed. Will prep patient per orders. Patient & family updated on plan of care.       The patient has a fraility score of 3-MANAGING WELL, based on independent of adl's        Patient took Aspirin 324 mg  today at 0500 prior to arrival.

## 2021-02-19 NOTE — PROCEDURES
[facility]  CARDIAC CATH    Name:  Conrad Lopez  MR#:  [id number]  :  1965  ACCOUNT #:  [de-identified]  DATE OF SERVICE:  2021    REFERRING PHYSICIAN:  Neda Butcher MD    PRIMARY CARE PHYSICIAN:  Brady Bazzi MD    PROCEDURES PERFORMED:  Left heart cath with coronary angiography and left ventriculogram, pressure wire directed PTCA and stenting of the distal LAD using an [place] drug-eluting stent. PREOPERATIVE DIAGNOSES:  Recurrent exertional and stress-induced substernal chest discomfort despite a normal nuclear stress test, worrisome for angina with a markedly elevated coronary calcium score and multiple risk factors in this 59-year-old -American female. POSTOPERATIVE DIAGNOSIS:  Severe left anterior descending disease. SURGEON:  Jyothi Vega MD    ASSISTANT:  None. ESTIMATED BLOOD LOSS:  Less than 5 mL. SPECIMENS REMOVED:  None. COMPLICATIONS:  None. IMPLANTS:  2.5 x 26 mm Idris drug-eluting stent deployed to high pressure in the distal LAD. ANESTHESIA:  The patient was sedated by Nikolas Mack with 2 mg of Versed, 75 mcg of fentanyl, monitored from 08:15 to 09:03 a.m. TOTAL CONTRAST:  180 mL of Isovue. PROCEDURE TECHNIQUE:  After informed consent was obtained, the patient was brought to the cath lab, prepped and draped in the usual fashion. A 6-Hungarian sheath was placed in the right radial artery via the micropuncture modified Seldinger technique and left heart catheterization performed using standard 5-Hungarian angled pigtail and Tiger catheters. Percutaneous intervention was performed through a 6-Hungarian XB3.0 guiding catheter utilizing Angiomax for anticoagulation with a therapeutic periprocedural ACT. 180 mg of Brilinta was administered. There were no complications. Manual pressure will be applied to the patient's access site via TR band protocol. PRESSURE RESULTS:  Aorta 123/60. Left ventricle 120 over 10-15. Left ventriculogram reveals normal left ventricular regional wall motion with ejection fraction greater than 60%. End-diastolic pressure is high normal to mildly elevated. There is no mitral regurgitation and no aortic valve gradient on catheter pullback. The ascending aorta appears normal.    CORONARY ANATOMY:  The left main has mild irregularity dividing into an LAD and circumflex in the usual fashion. The LAD wraps around the apex of the left ventricle and bifurcates at the apex. The vessel is a large-caliber vessel with diffuse plaque throughout the entire LAD. There is calcified proximal LAD disease up to about 40-50% angiographically. The mid vessel has diffuse disease to 20%. There is a distal eccentric 60-70% focal stenosis, but the LAD remains large as it wraps around the apex supplying the apical inferior and apical lateral branches. There is a significant vascular distribution distal to the mid to distal LAD stenosis. There are multiple small diagonal branches, all of which have diffuse proximal disease up to 60 to 70%, but these are sub 2-mm vessels and best left to medical therapy. Circumflex is a moderate-caliber nondominant system with diffuse mild disease up to 20% with extraluminal calcification. There is a small ramus intermedius branch, which has diffuse moderate disease to 30% proximally. The right coronary is a large dominant vessel, which has mild calcification throughout the proximal to mid vessel, but only underlying 10 to 20% luminal narrowing. The posterolateral system is small caliber with mild diffuse disease. The posterior descending branch is moderate in caliber with a focal ostial 50% narrowing, but the vessel is small caliber and the remainder of the vessel has minimal disease with LUCY III flow. I would treat this medically.     PERCUTANEOUS INTERVENTION REPORT:  After systemic anticoagulation with Angiomax and verification of therapeutic ACT, a pressure wire was directed into the mid LAD to test for hemodynamic significance of the proximal lesion. The iFR was negative at 0.94-0.95. We then advanced the pressure wire distally across the eccentric more severe lesion and the pressure wire was positive at 0.88-0.89. We used the pressure wire and predilated the distal LAD lesion with a 2.5 x 15 mm balloon up to 16 atmospheres. We then stented the entire segment of disease with a 2.5 x 26 mm Idris drug-eluting stent to 12 atmospheres. We postdilated with a 2.5-mm noncompliant balloon to 14 atmospheres at each stent margin with mild negative residual stenosis, but no dissection. We postdilated the mid part of the stent at the tightest calcified stenosis up to 26 atmospheres. There was less than 10% residual stenosis, no dissection and LUCY III flow. The patient tolerated the procedure well. CONCLUSIONS:  1. Pressure wire directed PTCA and stenting of the distal LAD. 2.  Pressure wire interrogation of the proximal LAD, which will be left to medical therapy. 3.  Three small diagonal branches, all of which have significant proximal disease greater than 60% angiographically, however, these vessels are sub 2 mm in diameter and will be left to medical therapy with augmentation of Imdur to 60 mg daily in addition of Ranexa 500 mg twice daily. 4.  Normal left ventricular regional wall motion ejection fraction. Cecille Stevens MD      [id number]  D:  02/19/2021 9:18  T:  02/19/2021 14:57  JOB #:  [id number]  CC:   MD Jayne Mg MD       [facility]

## 2021-02-19 NOTE — PROCEDURES
Brief Cardiac Procedure Note    Patient: Howard Wyatt MRN: [id number]  SSN: [id number]    YOB: 1965  Age: 64 y.o. Sex: female      Date of Procedure: 2/19/2021     Pre-procedure Diagnosis: Typical Angina    Post-procedure Diagnosis: Coronary Artery Disease    Reason for Procedure: Suspected CAD    Procedure: Left Heart Catheterization with Percutaneous Coronary Intervention    Brief Description of Procedure: IFR PROX AND DISTAL LAD, PCI DISTAL LAD    Performed By: Ori Dacosta MD     Assistants: NONE    Anesthesia: Moderate Sedation    Estimated Blood Loss: Less than 10 mL      Specimens: None    Implants: None    Findings:   LV NORMAL  LM MILD IRREG  LAD CA++ PROX LONG 40-50% --- iFR 0.94-0.95   DISTAL CA++ ECCENTRIC 70% --- iFR 0L87-0.88    2.5 X 26 ALESHIA TO 12, 2.5NC TO 14, 26, 14 GOOD RESULT, <10% RESIDUAL  LCX AND RCA MILD DZ  MULTIPLE <2mm DIAGONAL WITH DIFFUSE MOD-SEVERE PROX DZ, TOO SMALL FOR PCI, CONTINUE IMDUR, ADD RANEXA  RIGHT RADIAL    ASA/'BRILINTA/ANGIOMAX    Complications: None    Recommendations: Continue medical therapy.     Signed By: Ori Dacosta MD     February 19, 2021

## 2021-02-19 NOTE — PROGRESS NOTES
Discharge Same Day as PCI Teaching    Discharge teaching completed. Patient instructed on radial site care, including symptoms to report to MD, medication changes & Follow up Care to include:    1- Patient is being treated with 2 separate anti-platelet medications including aspirin 81 mg & Brilinta 90 mg. It is very important that these medications are filled today started tonight. Patient instructed on the importance of these medications & that these medications must not be stopped for any reason or without talking to the doctor first.  2-  Patient is also being discharged on a medication for cholesterol management (ie-statin) crestor 20 mg daily and instructed on the importance of continuing this medication as well. 3- Patient received a referral for Cardiac Rehab II, contact information was faxed to Cardiac rehab & patient given telephone number to contact ([phone]) Cardiac Rehab if they have not heard from them within 10 days. Patient up to bedside, vital signs and site stable. Patient ambulated to bathroom without difficulty. Patient voided without difficulty. Vascular site stable. 1545 Discharge instructions and home medications reviewed with patient. Time allowed for questions and answers. 1600 Patient ambulated second time without difficulty. Site stable after ambulation. Peripheral IV sites dc'd without difficulty with tips intact. 1615 Patient discharged to home with family.

## 2021-02-19 NOTE — ROUTINE PROCESS
TRANSFER - OUT REPORT:    [facility] w/ PCI  Dr. Harper Body  RRA w/ hemoband \"12\"    PTCA and 1 REKHA to LAD    Versed 2mg, Fentanyl 100mcg  Heparin 2,000 units   Angiomax bolus and gtt turned off @ 0907  Brilinta 180mg @ 0902      Verbal report given to Erick Espinoza RN(name) on [place]  being transferred to CPRU(unit) for routine progression of care       Report consisted of patients Situation, Background, Assessment and   Recommendations(SBAR). Information from the following report(s) SBAR and Procedure Summary was reviewed with the receiving nurse. Lines:   Peripheral IV 02/19/21 Right Antecubital (Active)       Peripheral IV 02/19/21 Left Antecubital (Active)        Opportunity for questions and clarification was provided.       Patient transported with:   Magnetic        '

## 2021-02-19 NOTE — DISCHARGE SUMMARY
[facility] Discharge Summary     Patient ID:  Teo Schumacher  [id number]  89 y.o.  1965    Admit date: 2/19/2021    Discharge date:  2/19/21    Admitting Physician: Carli Linton MD     Discharge Physician: NGOZI Gallegos/Dr. Sara Marquis    Admission Diagnoses: CAD (coronary artery disease) [I25.10]    Discharge Diagnoses:    Diagnosis    CAD (coronary artery disease)    Mixed hyperlipidemia    Essential hypertension    Atherosclerosis of abdominal aorta (HCC)    Benign essential tremor       Cardiology Procedures this admission:  Left heart catheterization with PCI  Consults: None    Hospital Course: Patient was seen at the office of [facility] by Dr. Sander Morse for complaints of CP  w multiple risk factors for CAD and was subsequently scheduled for a [facility] on 2/19/21. Patient underwent cardiac catheterization by Dr. Sara Marquis. Patient was found to have a 70% stenosis of the dLAD that was stented with a  2.5 X 26 ALESHIA  with < 10% residual stenosis. PLAD w negative iFR. LCX and RCA w mild disease. LV normal.  Patient tolerated the procedure well and was taken to recovery. That afternoon the patient was up feeling well without any complaints of chest pain or shortness of breath. Patient's right radial cath site was clean, dry and intact without hematoma or bruit. Patient was seen and examined by Dr. Sara Marquis and determined stable and ready for discharge. Patient was instructed on the importance of medication compliance including taking aspirin and Brilinta everyday without missing a dose. After receiving drug eluting stents, the patient will remain on dual anti-platelet therapy for 1 year. For maximized medical therapy for CAD, patient will continue BB and statin- imdur and crestor dose increased and ranexa added.   The patient will follow up with [facility] Dr. Sander Morse 3-1 at [facility] office and has been referred to cardiac rehab.    15 minute discussion w patient and daughter re cath findings and d/c instructions- all questions answered. Indication for treatment and risk of dual antiplatelet therapy was discussed with the patient and he/she understands to seek medical care immediately if any signs of bleeding. She was encouraged to continued to quit tobacco use. DISPOSITION: The patient is being discharged home in stable condition on a low saturated fat, low cholesterol and low salt diet. The patient is instructed to advance activities as tolerated to the limit of fatigue or shortness of breath. The patient is instructed to avoid all heavy lifting for 5 days. The patient is instructed to watch the cath site for bleeding/oozing; if seen, the patient is instructed to apply firm pressure with a clean cloth and call [facility] at [phone]. The patient is instructed to watch for signs of infection which include: increasing area of redness, fever/hot to touch or purulent drainage at the catheterization site. The patient is instructed not to soak in a bathtub for 7-10 days, but is cleared to shower. The patient is instructed to call the office or return to the ER for immediate evaluation for any shortness of breath or chest pain not relieved by NTG. Discharge Exam:   Visit Vitals  BP (!) 156/82   Pulse (!) 59   Resp 16   Wt 61.2 kg (135 lb)   LMP 07/02/2013   SpO2 98%   Breastfeeding No   BMI 27.27 kg/m²     Patient has been seen by Dr. Tez Bustillo: see his progress note for exam details.     Recent Results (from the past 24 hour(s))   EKG, 12 LEAD, INITIAL    Collection Time: 02/19/21  7:48 AM   Result Value Ref Range    Ventricular Rate 60 BPM    Atrial Rate 60 BPM    P-R Interval 190 ms    QRS Duration 72 ms    Q-T Interval 426 ms    QTC Calculation (Bezet) 426 ms    Calculated P Axis 68 degrees    Calculated R Axis 48 degrees    Calculated T Axis 60 degrees    Diagnosis       Normal sinus rhythm  Possible Left atrial enlargement  Low voltage QRS  Cannot rule out Anterior infarct , age undetermined  Abnormal ECG  When compared with ECG of 19-JAN-2017 08:11,  No significant change was found           Patient Instructions:   Current Discharge Medication List      START taking these medications    Details   ranolazine ER (Ranexa) 500 mg SR tablet Take 1 Tab by mouth two (2) times a day. Qty: 60 Tab, Refills: 11      ticagrelor (Brilinta) 90 mg tablet Take 1 Tab by mouth two (2) times a day. Qty: 60 Tab, Refills: 11         CONTINUE these medications which have CHANGED    Details   isosorbide mononitrate ER (IMDUR) 60 mg CR tablet Take 1 Tab by mouth daily. Qty: 90 Tab, Refills: 3      rosuvastatin (CRESTOR) 20 mg tablet Take 1 Tab by mouth nightly. Qty: 90 Tab, Refills: 3         CONTINUE these medications which have NOT CHANGED    Details   varenicline (CHANTIX STARTER KAVON) 0.5 mg (11)- 1 mg (42) DsPk Take 1 mg by mouth two (2) times daily (after meals). Qty: 1 Dose Pack, Refills: 1    Associated Diagnoses: Tobacco abuse      aspirin delayed-release 81 mg tablet Take 81 mg by mouth daily. metoprolol succinate (Toprol XL) 25 mg XL tablet Take 1 Tab by mouth daily. Qty: 90 Tab, Refills: 3      busPIRone (BUSPAR) 5 mg tablet Take 1 Tab by mouth three (3) times daily (with meals). Qty: 90 Tab, Refills: 5      nitroglycerin (Nitrostat) 0.4 mg SL tablet 1 Tab by SubLINGual route every five (5) minutes as needed for Chest Pain. Up to 3 doses, THEN PROCEED TO ER.   Qty: 25 Tab, Refills: 5         STOP taking these medications       naproxen sodium (ALEVE) 220 mg cap Comments:   Reason for Stopping:                 Signed:  Rachelle Robertson PA-C  2/19/2021  9:55 AM

## 2021-02-19 NOTE — PROGRESS NOTES
Terumo band completely deflated. 1315 Terumo band removed from right wrist using sterile technique. Sterile dressing applied. No signs and symptoms of bleeding, oozing or hematoma.

## 2021-02-19 NOTE — PROGRESS NOTES
Report received from [address] [place] [address]. Procedural findings communicated. Intra procedural  medication administration reviewed. Progression of care discussed.      Patient received into [address] [facility] 1 post sheath removal.     Access site without bleeding or swelling yes    Dressing dry and intact yes    Patient instructed to limit movement to right upper extremity    Routine post procedural vital signs and site assessment initiated yes

## 2021-02-20 NOTE — PROGRESS NOTES
SAME DAY DISCHARGE FOLLOW UP PHONE CALL           NAME : Yodit Coles           : 1965                     DATE/TIME:   21 at   8am [facility])                         MRN# [id number]        1. Ensure that the patient has had their new prescriptions filled & ask if they have taken their anti-platelet & aspirin that day. Pt has taken asa & brilinta today as ordered. Reinforced the importance of continuing these medications daily as ordered & to not stop for any reason without discussing with cardiologist first. Voiced good understanding    2. Ask about access site. Have the patient assess for any signs of a hematoma. Ask about any pain at access site. Right radial without bleeding or hematoma - states slight bruising but no pain numbness or tingling      3. Ask the patient if they had experienced any chest pain or shortness of breath. Denies any chest pain or shortness of breath.  Reports having some nose bleeds - informed that's probably due to blood thinners but reinforced the importance of continuing them & informed should nose bleeds get worse or unable  To stop bleeding to notify MD. Voiced good understanding

## 2021-03-09 ENCOUNTER — HOSPITAL ENCOUNTER (OUTPATIENT)
Dept: CARDIAC REHAB | Age: 56
Discharge: HOME | End: 2021-03-09

## 2021-03-09 NOTE — ROUTINE PROCESS
Date 3/9/21 Re: Scores from Psychological Testing Dear Dr. Tay Magallanes, Your patient, Mrs. Kiko Burroughs  ( 1965), has taken the Patient Health Questionnaire (PHQ-9) as part of their admission to the Cardiac Rehab program. The results of this test indicate that they may be experiencing depression. Her score of 13 was heavily influenced by her report of feeling tired, trouble sleeping and being figety nearly every day. She denied thoughts of or plans for self harm. She was encouraged to reach out to you for an appt to discuss these issues further. Thank you for your support and attention to this matter. Amy Mcdermott, DAVIDN, RN Cardiopulmonary Rehabilitation

## 2021-03-09 NOTE — ROUTINE PROCESS
Dear Dr. Shamar Shaw, Thank you for referring your patient,Mrs. Cecily Carrington ( 1965), to the Cardiopulmonary Rehabilitation Program at [facility]. She is a good candidate for the Cardiac Rehab Program and should see improvements with regular participation. We will be addressing appropriate interventions for modifiable risk factors with your patient during the next 12 weeks. We will contact you with any issues or concerns that may arise, or you can follow your patient's progress through [address] [place] [address] at any time. A final summary will be sent to you when the program is completed. Again, thank you for the referral. If we can be of further assistance, please feel free to contact the Cardiopulmonary Rehab staff at [phone]. Sincerely, DAVID WillinghamN, RN Cardiopulmonary Rehabilitation Nurse Liaison [facility]

## 2021-03-18 ENCOUNTER — HOSPITAL ENCOUNTER (OUTPATIENT)
Dept: CARDIAC REHAB | Age: 56
Discharge: HOME | End: 2021-03-18
Payer: COMMERCIAL

## 2021-03-18 VITALS — BODY MASS INDEX: 27.5 KG/M2 | WEIGHT: 136.4 LBS | HEIGHT: 59 IN

## 2021-03-18 PROCEDURE — 93798 PHYS/QHP OP CAR RHAB W/ECG: CPT

## 2021-03-19 ENCOUNTER — HOSPITAL ENCOUNTER (OUTPATIENT)
Dept: CARDIAC REHAB | Age: 56
Discharge: HOME | End: 2021-03-19
Payer: COMMERCIAL

## 2021-03-19 ENCOUNTER — APPOINTMENT (OUTPATIENT)
Dept: CARDIAC REHAB | Age: 56
End: 2021-03-19
Payer: COMMERCIAL

## 2021-03-19 PROCEDURE — 93798 PHYS/QHP OP CAR RHAB W/ECG: CPT

## 2021-03-22 ENCOUNTER — HOSPITAL ENCOUNTER (OUTPATIENT)
Dept: CARDIAC REHAB | Age: 56
Discharge: HOME | End: 2021-03-22
Payer: COMMERCIAL

## 2021-03-22 ENCOUNTER — APPOINTMENT (OUTPATIENT)
Dept: CARDIAC REHAB | Age: 56
End: 2021-03-22
Payer: COMMERCIAL

## 2021-03-22 PROCEDURE — 93798 PHYS/QHP OP CAR RHAB W/ECG: CPT

## 2021-03-24 ENCOUNTER — APPOINTMENT (OUTPATIENT)
Dept: CARDIAC REHAB | Age: 56
End: 2021-03-24
Payer: COMMERCIAL

## 2021-03-26 ENCOUNTER — APPOINTMENT (OUTPATIENT)
Dept: CARDIAC REHAB | Age: 56
End: 2021-03-26
Payer: COMMERCIAL

## 2021-03-29 ENCOUNTER — APPOINTMENT (OUTPATIENT)
Dept: CARDIAC REHAB | Age: 56
End: 2021-03-29
Payer: COMMERCIAL

## 2021-03-31 ENCOUNTER — APPOINTMENT (OUTPATIENT)
Dept: CARDIAC REHAB | Age: 56
End: 2021-03-31
Payer: COMMERCIAL

## 2021-04-02 ENCOUNTER — APPOINTMENT (OUTPATIENT)
Dept: CARDIAC REHAB | Age: 56
End: 2021-04-02

## 2021-04-05 ENCOUNTER — APPOINTMENT (OUTPATIENT)
Dept: CARDIAC REHAB | Age: 56
End: 2021-04-05

## 2021-04-06 LAB — ACT BLD: 665 SECS (ref 79–138)

## 2021-04-07 ENCOUNTER — APPOINTMENT (OUTPATIENT)
Dept: CARDIAC REHAB | Age: 56
End: 2021-04-07

## 2021-04-07 ENCOUNTER — HOSPITAL ENCOUNTER (OUTPATIENT)
Dept: ULTRASOUND IMAGING | Age: 56
Discharge: HOME | End: 2021-04-07
Attending: NURSE PRACTITIONER
Payer: COMMERCIAL

## 2021-04-07 DIAGNOSIS — M79.605 PAIN IN BOTH LOWER EXTREMITIES: ICD-10-CM

## 2021-04-07 DIAGNOSIS — M79.604 PAIN IN BOTH LOWER EXTREMITIES: ICD-10-CM

## 2021-04-07 DIAGNOSIS — T14.8XXA BRUISING: ICD-10-CM

## 2021-04-07 PROCEDURE — 93970 EXTREMITY STUDY: CPT

## 2021-04-09 ENCOUNTER — APPOINTMENT (OUTPATIENT)
Dept: CARDIAC REHAB | Age: 56
End: 2021-04-09

## 2021-04-09 ENCOUNTER — HOSPITAL ENCOUNTER (OUTPATIENT)
Dept: CARDIAC REHAB | Age: 56
End: 2021-04-09

## 2021-04-12 ENCOUNTER — APPOINTMENT (OUTPATIENT)
Dept: CARDIAC REHAB | Age: 56
End: 2021-04-12

## 2021-04-14 ENCOUNTER — APPOINTMENT (OUTPATIENT)
Dept: CARDIAC REHAB | Age: 56
End: 2021-04-14

## 2021-04-16 ENCOUNTER — APPOINTMENT (OUTPATIENT)
Dept: CARDIAC REHAB | Age: 56
End: 2021-04-16

## 2021-04-19 ENCOUNTER — APPOINTMENT (OUTPATIENT)
Dept: CARDIAC REHAB | Age: 56
End: 2021-04-19

## 2021-04-19 ENCOUNTER — HOSPITAL ENCOUNTER (OUTPATIENT)
Dept: CARDIAC REHAB | Age: 56
Discharge: HOME | End: 2021-04-19

## 2021-04-21 ENCOUNTER — HOSPITAL ENCOUNTER (OUTPATIENT)
Dept: CARDIAC REHAB | Age: 56
End: 2021-04-21

## 2021-04-21 ENCOUNTER — APPOINTMENT (OUTPATIENT)
Dept: CARDIAC REHAB | Age: 56
End: 2021-04-21

## 2021-04-23 ENCOUNTER — APPOINTMENT (OUTPATIENT)
Dept: CARDIAC REHAB | Age: 56
End: 2021-04-23

## 2021-04-26 ENCOUNTER — APPOINTMENT (OUTPATIENT)
Dept: CARDIAC REHAB | Age: 56
End: 2021-04-26

## 2021-04-28 ENCOUNTER — APPOINTMENT (OUTPATIENT)
Dept: CARDIAC REHAB | Age: 56
End: 2021-04-28

## 2021-04-30 ENCOUNTER — APPOINTMENT (OUTPATIENT)
Dept: CARDIAC REHAB | Age: 56
End: 2021-04-30

## 2021-04-30 ENCOUNTER — HOSPITAL ENCOUNTER (OUTPATIENT)
Dept: CARDIAC REHAB | Age: 56
Discharge: HOME | End: 2021-04-30

## 2021-05-03 ENCOUNTER — APPOINTMENT (OUTPATIENT)
Dept: CARDIAC REHAB | Age: 56
End: 2021-05-03

## 2021-05-05 ENCOUNTER — APPOINTMENT (OUTPATIENT)
Dept: CARDIAC REHAB | Age: 56
End: 2021-05-05

## 2021-05-07 ENCOUNTER — APPOINTMENT (OUTPATIENT)
Dept: CARDIAC REHAB | Age: 56
End: 2021-05-07

## 2021-05-10 ENCOUNTER — APPOINTMENT (OUTPATIENT)
Dept: CARDIAC REHAB | Age: 56
End: 2021-05-10

## 2021-05-12 ENCOUNTER — APPOINTMENT (OUTPATIENT)
Dept: CARDIAC REHAB | Age: 56
End: 2021-05-12

## 2021-05-14 ENCOUNTER — APPOINTMENT (OUTPATIENT)
Dept: CARDIAC REHAB | Age: 56
End: 2021-05-14

## 2021-05-17 ENCOUNTER — APPOINTMENT (OUTPATIENT)
Dept: CARDIAC REHAB | Age: 56
End: 2021-05-17

## 2021-05-19 ENCOUNTER — APPOINTMENT (OUTPATIENT)
Dept: CARDIAC REHAB | Age: 56
End: 2021-05-19

## 2021-05-21 ENCOUNTER — APPOINTMENT (OUTPATIENT)
Dept: CARDIAC REHAB | Age: 56
End: 2021-05-21

## 2021-05-24 ENCOUNTER — APPOINTMENT (OUTPATIENT)
Dept: CARDIAC REHAB | Age: 56
End: 2021-05-24

## 2021-05-26 ENCOUNTER — APPOINTMENT (OUTPATIENT)
Dept: CARDIAC REHAB | Age: 56
End: 2021-05-26

## 2021-05-28 ENCOUNTER — APPOINTMENT (OUTPATIENT)
Dept: CARDIAC REHAB | Age: 56
End: 2021-05-28

## 2021-06-02 ENCOUNTER — APPOINTMENT (OUTPATIENT)
Dept: CARDIAC REHAB | Age: 56
End: 2021-06-02

## 2021-06-04 ENCOUNTER — APPOINTMENT (OUTPATIENT)
Dept: CARDIAC REHAB | Age: 56
End: 2021-06-04

## 2021-06-07 ENCOUNTER — APPOINTMENT (OUTPATIENT)
Dept: CARDIAC REHAB | Age: 56
End: 2021-06-07

## 2021-07-18 ENCOUNTER — HOSPITAL ENCOUNTER (EMERGENCY)
Age: 56
Discharge: HOME | End: 2021-07-18
Attending: EMERGENCY MEDICINE
Payer: COMMERCIAL

## 2021-07-18 VITALS
WEIGHT: 140 LBS | DIASTOLIC BLOOD PRESSURE: 79 MMHG | BODY MASS INDEX: 28.28 KG/M2 | OXYGEN SATURATION: 100 % | TEMPERATURE: 99.9 F | RESPIRATION RATE: 18 BRPM | HEART RATE: 78 BPM | SYSTOLIC BLOOD PRESSURE: 168 MMHG

## 2021-07-18 DIAGNOSIS — T14.8XXA MUSCLE STRAIN: Primary | ICD-10-CM

## 2021-07-18 PROCEDURE — 99282 EMERGENCY DEPT VISIT SF MDM: CPT

## 2021-07-18 RX ORDER — METHOCARBAMOL 750 MG/1
750 TABLET, FILM COATED ORAL 3 TIMES DAILY
Qty: 18 TABLET | Refills: 0 | Status: SHIPPED | OUTPATIENT
Start: 2021-07-18 | End: 2021-08-10 | Stop reason: ALTCHOICE

## 2021-07-18 NOTE — ED TRIAGE NOTES
Pt was chasing after her puppy last night and now is having left leg pain from her buttocks down her leg     Took tylenol around 0330

## 2021-07-18 NOTE — Clinical Note
[id number] [address] EMERGENCY DEPT  [address] [facility] [address] [phone]    Work/School Note    Date: 7/18/2021    To Whom It May concern:    Mark Henry was seen and treated today in the emergency room by the following provider(s):  Attending Provider: Lidia Qureshi MD.      Mark Henry is excused from work/school on 07/18/21 and 07/19/21. She is medically clear to return to work/school on 7/20/2021.        Sincerely,          Josh Ward MD

## 2021-07-18 NOTE — ED PROVIDER NOTES
Mask was worn during the entire patient examination. Karl Fine is a 64 y.o. female who presents to the ED with a chief complaint of left leg pain. It starts just below her buttocks and radiates around the leg. She denies any back pain she had no fall. She injured it while trying to catch her dog who had a escaped outside and she was doing multiple sudden movements. She states the pain is moderate and constant. Certain positions help the pain. The history is provided by the patient. Leg Pain   Pertinent negatives include no numbness and no back pain.         Past Medical History:   Diagnosis Date    Anemia     not currently taking iron supplements; had hysterectomy    Arthritis     foot    CAD (coronary artery disease) 2021    GERD (gastroesophageal reflux disease)     no meds    History of chicken pox     Hypertension     pt denies    OAB (overactive bladder) 7/3/2014       Past Surgical History:   Procedure Laterality Date    HX  SECTION      x3    HX DILATION AND CURETTAGE      HX HYSTERECTOMY  2013    [facility]    HX ORTHOPAEDIC      right ankle    HX TUBAL LIGATION      SC CARDIAC SURG PROCEDURE UNLIST      stent x 1---    SC LAP,TUBAL CAUTERY           Family History:   Problem Relation Age of Onset    Hypertension Father     Diabetes Father     Colon Cancer Father 76    Heart Attack Father 68    Heart Disease Father     No Known Problems Mother     Malignant Hyperthermia Neg Hx     Pseudocholinesterase Deficiency Neg Hx     Delayed Awakening Neg Hx     Post-op Nausea/Vomiting Neg Hx     Emergence Delirium Neg Hx     Post-op Cognitive Dysfunction Neg Hx     Other Neg Hx     Cancer Neg Hx     Breast Cancer Neg Hx     Ovarian Cancer Neg Hx        Social History     Socioeconomic History    Marital status: SINGLE     Spouse name: Not on file    Number of children: Not on file    Years of education: Not on file    Highest education level: Not on file   Occupational History    Not on file   Tobacco Use    Smoking status: Former Smoker     Packs/day: 1.00     Years: 35.00     Pack years: 35.00     Quit date: 2020     Years since quittin.5    Smokeless tobacco: Never Used    Tobacco comment: 16 years   Vaping Use    Vaping Use: Never used   Substance and Sexual Activity    Alcohol use: Yes     Alcohol/week: 1.0 standard drinks     Types: 1 Glasses of wine per week     Comment: occosaional/social    Drug use: No    Sexual activity: Yes     Partners: Male     Birth control/protection: Surgical     Comment: [facility]   Other Topics Concern     Service Not Asked    Blood Transfusions Not Asked    Caffeine Concern Not Asked    Occupational Exposure Not Asked    Hobby Hazards Not Asked    Sleep Concern Not Asked    Stress Concern Not Asked    Weight Concern Not Asked    Special Diet Not Asked    Back Care Not Asked    Exercise Not Asked    Bike Helmet Not Asked   [facility] Not Asked    Self-Exams Not Asked   Social History Narrative    Not on file     Social Determinants of Health     Financial Resource Strain:     Difficulty of Paying Living Expenses:    Food Insecurity:     Worried About Running Out of Food in the Last Year:     [address] in the Last Year:    Transportation Needs:     Lack of Transportation (Medical):  Lack of Transportation (Non-Medical):    Physical Activity:     Days of Exercise per Week:     Minutes of Exercise per Session:    Stress:     Feeling of Stress :    Social Connections:     Frequency of Communication with Friends and Family:     Frequency of Social Gatherings with Friends and Family:     Attends Restoration Services:     Active Member of Clubs or Organizations:     Attends Club or Organization Meetings:     Marital Status:    Intimate Partner Violence:     Fear of Current or Ex-Partner:     Emotionally Abused:     Physically Abused:     Sexually Abused:           ALLERGIES: Patient has no known allergies. Review of Systems   Constitutional: Negative for chills and fever. Musculoskeletal: Positive for gait problem and myalgias. Negative for back pain. Skin: Negative for color change, pallor and wound. Neurological: Negative for weakness and numbness. Vitals:    07/18/21 0420   BP: (!) 168/79   Pulse: 78   Resp: 18   Temp: 99.9 °F (37.7 °C)   SpO2: 100%   Weight: 63.5 kg (140 lb)            Physical Exam  Vitals and nursing note reviewed. Constitutional:       General: She is not in acute distress. Appearance: She is well-developed. She is not ill-appearing, toxic-appearing or diaphoretic. HENT:      Head: Normocephalic and atraumatic. Eyes:      General: No scleral icterus. Conjunctiva/sclera: Conjunctivae normal.   Pulmonary:      Effort: Pulmonary effort is normal. No respiratory distress. Breath sounds: No stridor. No wheezing, rhonchi or rales. Musculoskeletal:      Cervical back: No rigidity. Right lower leg: No edema. Left lower leg: No edema. Comments: Pain with range of motion of the left leg. Pain with palpation to the superior aspect of the hamstring. Patient able to ambulate however is painful when she does so and painful with certain positional movements. No deformities noted   Skin:     Coloration: Skin is not jaundiced or pale. Neurological:      Mental Status: She is alert. Mental status is at baseline. Psychiatric:         Mood and Affect: Mood normal.         Behavior: Behavior normal.          MDM  Number of Diagnoses or Management Options  Muscle strain  Diagnosis management comments: Patient had no direct trauma or fall I doubt bony injury suspect musculoskeletal injury likely muscle strain from the area that is injured. She is on blood thinners and cannot take NSAIDs will take Tylenol and I will add muscle relaxer to this regiment.       Marisela Agudelo MD; 7/18/2021 @5:05 AM Voice dictation software was used during the making of this note. This software is not perfect and grammatical and other typographical errors may be present.   This note has not been proofread for errors.  ===================================================================            Procedures

## 2021-07-18 NOTE — ED NOTES
I have reviewed discharge instructions with the patient. The patient verbalized understanding. Patient left ED via Discharge Method: ambulatory to Home with (insert name of family/friend, self, transportself). Opportunity for questions and clarification provided. Patient given 1 scripts. To continue your aftercare when you leave the hospital, you may receive an automated call from our care team to check in on how you are doing. This is a free service and part of our promise to provide the best care and service to meet your aftercare needs.  If you have questions, or wish to unsubscribe from this service please call [phone]. Thank you for Choosing our [facility] Emergency Department.

## 2021-11-29 ENCOUNTER — HOSPITAL ENCOUNTER (OUTPATIENT)
Dept: CT IMAGING | Age: 56
Discharge: HOME | End: 2021-11-29
Attending: INTERNAL MEDICINE
Payer: COMMERCIAL

## 2021-11-29 DIAGNOSIS — Z12.2 SCREENING FOR LUNG CANCER: ICD-10-CM

## 2021-11-29 DIAGNOSIS — Z72.0 TOBACCO USE: ICD-10-CM

## 2021-11-29 PROCEDURE — 71271 CT THORAX LUNG CANCER SCR C-: CPT

## 2021-11-30 NOTE — PROGRESS NOTES
Spoke with patient advised per  CT showed only lung nodules, but no cancer. Will need a CT chest w/o contrast in 1 year for lung nodules. Pt expressed understanding.

## 2021-11-30 NOTE — PROGRESS NOTES
CT showed only lung nodules, but no cancer. Will need a CT chest w/o contrast in 1 year for lung nodules.

## 2022-03-18 PROBLEM — I25.10 CORONARY ARTERY DISEASE INVOLVING NATIVE CORONARY ARTERY OF NATIVE HEART WITHOUT ANGINA PECTORIS: Status: ACTIVE | Noted: 2021-02-19

## 2022-03-19 PROBLEM — I10 ESSENTIAL HYPERTENSION: Status: ACTIVE | Noted: 2018-03-15

## 2022-03-19 PROBLEM — E78.2 MIXED HYPERLIPIDEMIA: Status: ACTIVE | Noted: 2020-11-25

## 2022-03-19 PROBLEM — G25.0 BENIGN ESSENTIAL TREMOR: Status: ACTIVE | Noted: 2018-03-15

## 2022-03-20 PROBLEM — I70.0 ATHEROSCLEROSIS OF ABDOMINAL AORTA (HCC): Status: ACTIVE | Noted: 2018-03-15

## 2022-04-21 ENCOUNTER — HOSPITAL ENCOUNTER (OUTPATIENT)
Dept: MAMMOGRAPHY | Age: 57
Discharge: HOME | End: 2022-04-21
Attending: OBSTETRICS & GYNECOLOGY

## 2022-04-21 DIAGNOSIS — Z12.31 SCREENING MAMMOGRAM FOR BREAST CANCER: ICD-10-CM

## 2022-08-10 ENCOUNTER — OFFICE VISIT (OUTPATIENT)
Dept: INTERNAL MEDICINE CLINIC | Facility: CLINIC | Age: 57
End: 2022-08-10
Payer: COMMERCIAL

## 2022-08-10 VITALS
HEIGHT: 59 IN | SYSTOLIC BLOOD PRESSURE: 131 MMHG | HEART RATE: 75 BPM | WEIGHT: 124 LBS | BODY MASS INDEX: 25 KG/M2 | DIASTOLIC BLOOD PRESSURE: 82 MMHG | TEMPERATURE: 97.2 F | OXYGEN SATURATION: 96 %

## 2022-08-10 DIAGNOSIS — I10 ESSENTIAL HYPERTENSION: ICD-10-CM

## 2022-08-10 DIAGNOSIS — E78.2 MIXED HYPERLIPIDEMIA: ICD-10-CM

## 2022-08-10 DIAGNOSIS — I25.10 CORONARY ARTERY DISEASE INVOLVING NATIVE CORONARY ARTERY OF NATIVE HEART WITHOUT ANGINA PECTORIS: Primary | ICD-10-CM

## 2022-08-10 DIAGNOSIS — I70.0 ATHEROSCLEROSIS OF ABDOMINAL AORTA (HCC): ICD-10-CM

## 2022-08-10 DIAGNOSIS — J43.1 PANLOBULAR EMPHYSEMA (HCC): ICD-10-CM

## 2022-08-10 DIAGNOSIS — I25.10 CORONARY ARTERY DISEASE INVOLVING NATIVE CORONARY ARTERY OF NATIVE HEART WITHOUT ANGINA PECTORIS: ICD-10-CM

## 2022-08-10 LAB
ALBUMIN SERPL-MCNC: 3.8 G/DL (ref 3.5–5)
ALBUMIN/GLOB SERPL: 1.2 {RATIO} (ref 1.2–3.5)
ALP SERPL-CCNC: 51 U/L (ref 50–136)
ALT SERPL-CCNC: 27 U/L (ref 12–65)
ANION GAP SERPL CALC-SCNC: 1 MMOL/L (ref 7–16)
AST SERPL-CCNC: 16 U/L (ref 15–37)
BASOPHILS # BLD: 0 K/UL (ref 0–0.2)
BASOPHILS NFR BLD: 1 % (ref 0–2)
BILIRUB DIRECT SERPL-MCNC: 0.2 MG/DL
BILIRUB SERPL-MCNC: 0.5 MG/DL (ref 0.2–1.1)
BUN SERPL-MCNC: 13 MG/DL (ref 6–23)
CALCIUM SERPL-MCNC: 9.2 MG/DL (ref 8.3–10.4)
CHLORIDE SERPL-SCNC: 110 MMOL/L (ref 98–107)
CHOLEST SERPL-MCNC: 176 MG/DL
CO2 SERPL-SCNC: 30 MMOL/L (ref 21–32)
CREAT SERPL-MCNC: 0.8 MG/DL (ref 0.6–1)
DIFFERENTIAL METHOD BLD: ABNORMAL
EOSINOPHIL # BLD: 0.1 K/UL (ref 0–0.8)
EOSINOPHIL NFR BLD: 1 % (ref 0.5–7.8)
ERYTHROCYTE [DISTWIDTH] IN BLOOD BY AUTOMATED COUNT: 15.7 % (ref 11.9–14.6)
GLOBULIN SER CALC-MCNC: 3.1 G/DL (ref 2.3–3.5)
GLUCOSE SERPL-MCNC: 109 MG/DL (ref 65–100)
HCT VFR BLD AUTO: 43.1 % (ref 35.8–46.3)
HDLC SERPL-MCNC: 93 MG/DL (ref 40–60)
HDLC SERPL: 1.9 {RATIO}
HGB BLD-MCNC: 13.8 G/DL (ref 11.7–15.4)
IMM GRANULOCYTES # BLD AUTO: 0 K/UL (ref 0–0.5)
IMM GRANULOCYTES NFR BLD AUTO: 0 % (ref 0–5)
LDLC SERPL CALC-MCNC: 73.2 MG/DL
LYMPHOCYTES # BLD: 2.2 K/UL (ref 0.5–4.6)
LYMPHOCYTES NFR BLD: 44 % (ref 13–44)
MCH RBC QN AUTO: 23.2 PG (ref 26.1–32.9)
MCHC RBC AUTO-ENTMCNC: 32 G/DL (ref 31.4–35)
MCV RBC AUTO: 72.6 FL (ref 79.6–97.8)
MONOCYTES # BLD: 0.4 K/UL (ref 0.1–1.3)
MONOCYTES NFR BLD: 9 % (ref 4–12)
NEUTS SEG # BLD: 2.3 K/UL (ref 1.7–8.2)
NEUTS SEG NFR BLD: 45 % (ref 43–78)
NRBC # BLD: 0 K/UL (ref 0–0.2)
PLATELET # BLD AUTO: 186 K/UL (ref 150–450)
PMV BLD AUTO: 11.4 FL (ref 9.4–12.3)
POTASSIUM SERPL-SCNC: 4 MMOL/L (ref 3.5–5.1)
PROT SERPL-MCNC: 6.9 G/DL (ref 6.3–8.2)
RBC # BLD AUTO: 5.94 M/UL (ref 4.05–5.2)
SODIUM SERPL-SCNC: 141 MMOL/L (ref 136–145)
TRIGL SERPL-MCNC: 49 MG/DL (ref 35–150)
TSH, 3RD GENERATION: 1.43 UIU/ML (ref 0.36–3.74)
VLDLC SERPL CALC-MCNC: 9.8 MG/DL (ref 6–23)
WBC # BLD AUTO: 5 K/UL (ref 4.3–11.1)

## 2022-08-10 PROCEDURE — 99214 OFFICE O/P EST MOD 30 MIN: CPT | Performed by: INTERNAL MEDICINE

## 2022-08-10 RX ORDER — UMECLIDINIUM BROMIDE AND VILANTEROL TRIFENATATE 62.5; 25 UG/1; UG/1
1 POWDER RESPIRATORY (INHALATION) DAILY
Qty: 30 EACH | Refills: 5 | Status: SHIPPED | OUTPATIENT
Start: 2022-08-10

## 2022-08-10 ASSESSMENT — ANXIETY QUESTIONNAIRES
5. BEING SO RESTLESS THAT IT IS HARD TO SIT STILL: 0
7. FEELING AFRAID AS IF SOMETHING AWFUL MIGHT HAPPEN: 0
3. WORRYING TOO MUCH ABOUT DIFFERENT THINGS: 0
6. BECOMING EASILY ANNOYED OR IRRITABLE: 0
4. TROUBLE RELAXING: 0
2. NOT BEING ABLE TO STOP OR CONTROL WORRYING: 0
GAD7 TOTAL SCORE: 0
IF YOU CHECKED OFF ANY PROBLEMS ON THIS QUESTIONNAIRE, HOW DIFFICULT HAVE THESE PROBLEMS MADE IT FOR YOU TO DO YOUR WORK, TAKE CARE OF THINGS AT HOME, OR GET ALONG WITH OTHER PEOPLE: NOT DIFFICULT AT ALL
1. FEELING NERVOUS, ANXIOUS, OR ON EDGE: 0

## 2022-08-10 ASSESSMENT — PATIENT HEALTH QUESTIONNAIRE - PHQ9
SUM OF ALL RESPONSES TO PHQ QUESTIONS 1-9: 0
2. FEELING DOWN, DEPRESSED OR HOPELESS: 0
SUM OF ALL RESPONSES TO PHQ QUESTIONS 1-9: 0
SUM OF ALL RESPONSES TO PHQ QUESTIONS 1-9: 0
1. LITTLE INTEREST OR PLEASURE IN DOING THINGS: 0
SUM OF ALL RESPONSES TO PHQ QUESTIONS 1-9: 0
SUM OF ALL RESPONSES TO PHQ9 QUESTIONS 1 & 2: 0

## 2022-08-10 ASSESSMENT — ENCOUNTER SYMPTOMS
SHORTNESS OF BREATH: 0
BLOOD IN STOOL: 0

## 2022-08-10 NOTE — PROGRESS NOTES
Priya Jiang M.D. Internal Medicine  [place] [id number] [facility], [address]   Phone: [phone]  Fax: [phone]    Hypertension  This is a chronic problem. The current episode started more than 1 year ago. The problem has been waxing and waning since onset. The problem is controlled. Pertinent negatives include no chest pain or shortness of breath. There are no associated agents to hypertension. Risk factors for coronary artery disease include dyslipidemia and post-menopausal state. Past treatments include beta blockers. The current treatment provides moderate improvement. Ellis Avendaño is a 62 y.o. Black / [place] American female. Current Outpatient Medications   Medication Sig Dispense Refill    aspirin 81 MG EC tablet Take 81 mg by mouth daily      buPROPion (WELLBUTRIN XL) 150 MG extended release tablet Take 150 mg by mouth      busPIRone (BUSPAR) 10 MG tablet Take 10 mg by mouth 3 times daily      isosorbide mononitrate (IMDUR) 60 MG extended release tablet Take 60 mg by mouth daily      metoprolol succinate (TOPROL XL) 25 MG extended release tablet Take 25 mg by mouth daily      montelukast (SINGULAIR) 10 MG tablet Take 10 mg by mouth daily      nitroGLYCERIN (NITROSTAT) 0.4 MG SL tablet Place 0.4 mg under the tongue      omeprazole (PRILOSEC) 20 MG delayed release capsule Take 20 mg by mouth daily      ranolazine (RANEXA) 500 MG extended release tablet Take 500 mg by mouth 2 times daily      rosuvastatin (CRESTOR) 20 MG tablet Take 20 mg by mouth      ticagrelor (BRILINTA) 90 MG TABS tablet Take 90 mg by mouth 2 times daily       No current facility-administered medications for this visit.      No Known Allergies    Past Medical History:   Diagnosis Date    Anemia     not currently taking iron supplements; had hysterectomy    Arthritis     foot    CAD (coronary artery disease) 2/19/2021    GERD (gastroesophageal reflux disease)     no meds    History of chicken pox     Hypertension     pt denies    OAB (overactive bladder) 7/3/2014     Past Surgical History:   Procedure Laterality Date     SECTION      x3    COLONOSCOPY      DILATION AND CURETTAGE OF UTERUS      HYSTERECTOMY  2013    [facility]    LAP,TUBAL CAUTERY      ORTHOPEDIC SURGERY      right ankle    MO CARDIAC SURG PROCEDURE UNLIST      stent x 1---2-    TUBAL LIGATION       Social History     Tobacco Use    Smoking status: Every Day     Packs/day: 0.50     Types: Cigarettes     Start date: 1982    Smokeless tobacco: Never    Tobacco comments:     Quit smokin years   Substance Use Topics    Alcohol use: Yes     Alcohol/week: 1.0 standard drink    Drug use: No     Family History   Problem Relation Age of Onset    Ovarian Cancer Neg Hx     Breast Cancer Neg Hx     Cancer Neg Hx     Other Neg Hx     Post-op Cognitive Dysfunction Neg Hx     Emergence Delirium Neg Hx     Post-op Nausea/Vomiting Neg Hx     Delayed Awakening Neg Hx     Pseudochol. Deficiency Neg Hx     Malig Hypertherm Neg Hx     No Known Problems Mother     Heart Disease Father     Heart Attack Father 68    Colon Cancer Father 76    Hypertension Father     Diabetes Father       Review of Systems   Respiratory:  Negative for shortness of breath. Cardiovascular:  Negative for chest pain. Gastrointestinal:  Negative for blood in stool. Physical Exam  Vitals and nursing note reviewed. Constitutional:       General: She is not in acute distress. Appearance: Normal appearance. She is not ill-appearing, toxic-appearing or diaphoretic. HENT:      Head: Normocephalic and atraumatic. Right Ear: External ear normal.      Left Ear: External ear normal.   Eyes:      General: No scleral icterus. Right eye: No discharge. Left eye: No discharge. Conjunctiva/sclera: Conjunctivae normal.   Cardiovascular:      Rate and Rhythm: Normal rate and regular rhythm. Heart sounds: Normal heart sounds.  No murmur heard.    No friction rub. No gallop. Pulmonary:      Effort: Pulmonary effort is normal. No respiratory distress. Breath sounds: Normal breath sounds. No stridor. No wheezing, rhonchi or rales. Abdominal:      General: Abdomen is flat. Bowel sounds are normal. There is no distension. Palpations: Abdomen is soft. There is no mass. Tenderness: There is no abdominal tenderness. There is no guarding or rebound. Musculoskeletal:      Right lower leg: No edema. Left lower leg: No edema. Skin:     General: Skin is warm and dry. Coloration: Skin is not jaundiced or pale. Findings: No erythema. Neurological:      General: No focal deficit present. Mental Status: She is alert and oriented to person, place, and time. Mental status is at baseline. Psychiatric:         Mood and Affect: Mood normal.         Behavior: Behavior normal.         Thought Content: Thought content normal.         Judgment: Judgment normal.   I have reviewed/discussed the above normal BMI with the patient. I have recommended the following interventions: dietary management education, guidance, and counseling and encourage exercise . ASSESSMENT AND PLAN:    CAD: KELLY to LAD 2/19/21. Follows with Cardiology (Dr. Kandi Soto). Maintained on Asa/Brilinta. Risk factors medically modified per below. Taking medications w/o problems. Well controlled w/o angina or STRATTON. Continue Asa/Brilinta (No bleeding). Continue risk factor modifications per below. Follow up with Dr. Kandi Soto (Also with aortic calcifications). HTN: Taking current regimen (Toprol XL 25) w/o problems. Home BPs = Not being checked. Well controlled. Continue current regimen. Asked to monitor BP at home, call me if it runs above 140/80, and bring in a log next time. HLD: Taking current regimen (Crestor 20) w/o problems. Well controlled. Continue current regimen. FLPs:  3/15/18 Untreated = [217/119/87/53].   11/25/20 Untreated =

## 2022-09-27 RX ORDER — BUSPIRONE HYDROCHLORIDE 10 MG/1
TABLET ORAL
Qty: 90 TABLET | Refills: 5 | Status: SHIPPED | OUTPATIENT
Start: 2022-09-27

## 2022-09-27 NOTE — TELEPHONE ENCOUNTER
Request received electronically. Chart reviewed. .  Order pended in this encounter. Pharmacy up to date.     Scheduled F/U: none  Last Seen:    1/20/2021

## 2022-09-30 ENCOUNTER — TELEPHONE (OUTPATIENT)
Dept: INTERNAL MEDICINE CLINIC | Facility: CLINIC | Age: 57
End: 2022-09-30

## 2022-09-30 DIAGNOSIS — M54.2 NECK PAIN: Primary | ICD-10-CM

## 2022-09-30 NOTE — TELEPHONE ENCOUNTER
Patient called, never heard anything from an ENT that she thought she was supposed get referred to, nor did anyone call her about having the neck US. Order for US was still effective, she will call to schedule an appointment with radiology. I was unable to see a referral to ENT, please refer again.

## 2022-10-03 NOTE — TELEPHONE ENCOUNTER
Spoke with patient regarding referral she stated that she poke with someone and has an appt this coming Thursday.

## 2022-10-06 ENCOUNTER — HOSPITAL ENCOUNTER (OUTPATIENT)
Dept: ULTRASOUND IMAGING | Age: 57
Discharge: HOME | End: 2022-10-09

## 2022-10-06 DIAGNOSIS — M54.2 NECK PAIN: ICD-10-CM

## 2022-10-19 ENCOUNTER — OFFICE VISIT (OUTPATIENT)
Dept: ENT CLINIC | Age: 57
End: 2022-10-19
Payer: COMMERCIAL

## 2022-10-19 VITALS
DIASTOLIC BLOOD PRESSURE: 68 MMHG | WEIGHT: 125 LBS | BODY MASS INDEX: 25.2 KG/M2 | HEIGHT: 59 IN | SYSTOLIC BLOOD PRESSURE: 118 MMHG

## 2022-10-19 DIAGNOSIS — K21.9 LPRD (LARYNGOPHARYNGEAL REFLUX DISEASE): Primary | ICD-10-CM

## 2022-10-19 DIAGNOSIS — R07.0 THROAT BURNING: ICD-10-CM

## 2022-10-19 PROCEDURE — 99204 OFFICE O/P NEW MOD 45 MIN: CPT | Performed by: OTOLARYNGOLOGY

## 2022-10-19 RX ORDER — ESOMEPRAZOLE MAGNESIUM 40 MG/1
40 CAPSULE, DELAYED RELEASE ORAL
Qty: 30 CAPSULE | Refills: 5 | Status: SHIPPED | OUTPATIENT
Start: 2022-10-19

## 2022-10-19 ASSESSMENT — ENCOUNTER SYMPTOMS
ALLERGIC/IMMUNOLOGIC NEGATIVE: 1
TROUBLE SWALLOWING: 0
RESPIRATORY NEGATIVE: 1
GASTROINTESTINAL NEGATIVE: 1
SORE THROAT: 1
VOICE CHANGE: 0
EYES NEGATIVE: 1

## 2022-10-19 NOTE — PROGRESS NOTES
Chief Complaint   Patient presents with    New Patient     Burning in throat, sob, smoker, social drinker       HPI:  Subha Monson is a 62 y.o. female seen today in initial consultation for her throat. She reports a burning sensation in her throat which has been intermittently present for the past several yrs. This burning pain will be present for several days and then improve for a few weeks. When it is present she does describe a true pain, but there is no dysphagia or odynophagia. She is a smoker, approximately 1 pack/day currently, but has no previous pharyngeal pathology. She was prescribed omeprazole 20 mg daily about 2 months ago, and has not seen any improvement since then. Past Medical History, Past Surgical History, Family history, Social History, and Medications were all reviewed with the patient today and updated as necessary. No Known Allergies  Patient Active Problem List   Diagnosis    Coronary artery disease involving native coronary artery of native heart without angina pectoris    Essential hypertension    Mixed hyperlipidemia    Tobacco use    Benign essential tremor    Atherosclerosis of abdominal aorta (HCC)    Panlobular emphysema (HCC)     Current Outpatient Medications   Medication Sig    esomeprazole (NEXIUM) 40 MG delayed release capsule Take 1 capsule by mouth every morning (before breakfast)    busPIRone (BUSPAR) 10 MG tablet tid    umeclidinium-vilanterol (ANORO ELLIPTA) 62.5-25 MCG/INH AEPB inhaler Inhale 1 puff into the lungs in the morning.     aspirin 81 MG EC tablet Take 81 mg by mouth daily    buPROPion (WELLBUTRIN XL) 150 MG extended release tablet Take 150 mg by mouth    isosorbide mononitrate (IMDUR) 60 MG extended release tablet Take 60 mg by mouth daily    metoprolol succinate (TOPROL XL) 25 MG extended release tablet Take 25 mg by mouth daily    montelukast (SINGULAIR) 10 MG tablet Take 10 mg by mouth daily    nitroGLYCERIN (NITROSTAT) 0.4 MG SL tablet Place 0.4 mg under the tongue    omeprazole (PRILOSEC) 20 MG delayed release capsule Take 20 mg by mouth daily    ranolazine (RANEXA) 500 MG extended release tablet Take 500 mg by mouth 2 times daily    rosuvastatin (CRESTOR) 20 MG tablet Take 20 mg by mouth    ticagrelor (BRILINTA) 90 MG TABS tablet Take 90 mg by mouth 2 times daily     No current facility-administered medications for this visit. Past Medical History:   Diagnosis Date    Anemia     not currently taking iron supplements; had hysterectomy    Arthritis     foot    CAD (coronary artery disease) 2021    GERD (gastroesophageal reflux disease)     no meds    History of chicken pox     Hypertension     pt denies    OAB (overactive bladder) 7/3/2014     Social History     Tobacco Use    Smoking status: Every Day     Packs/day: 0.50     Types: Cigarettes     Start date: 1982    Smokeless tobacco: Never    Tobacco comments:     Quit smokin years   Substance Use Topics    Alcohol use: Yes     Alcohol/week: 1.0 standard drink     Past Surgical History:   Procedure Laterality Date     SECTION      x3    COLONOSCOPY      DILATION AND CURETTAGE OF UTERUS      HYSTERECTOMY (CERVIX STATUS UNKNOWN)  2013    [facility]    LAP,TUBAL CAUTERY      ORTHOPEDIC SURGERY      right ankle    UT CARDIAC SURG PROCEDURE UNLIST      stent x 1---    TUBAL LIGATION       Family History   Problem Relation Age of Onset    Ovarian Cancer Neg Hx     Breast Cancer Neg Hx     Cancer Neg Hx     Other Neg Hx     Post-op Cognitive Dysfunction Neg Hx     Emergence Delirium Neg Hx     Post-op Nausea/Vomiting Neg Hx     Delayed Awakening Neg Hx     Pseudochol. Deficiency Neg Hx     Malig Hypertherm Neg Hx     No Known Problems Mother     Heart Disease Father     Heart Attack Father 68    Colon Cancer Father 76    Hypertension Father     Diabetes Father         ROS:    Review of Systems   HENT:  Positive for sore throat (burning).  Negative for trouble swallowing and voice change. Eyes: Negative. Respiratory: Negative. Cardiovascular: Negative. Gastrointestinal: Negative. Endocrine: Negative. Genitourinary: Negative. Musculoskeletal: Negative. Skin: Negative. Allergic/Immunologic: Negative. Neurological: Negative. Hematological: Negative. Psychiatric/Behavioral: Negative. PHYSICAL EXAM:    /68 (Site: Left Upper Arm, Position: Sitting)   Ht 4' 11\" (1.499 m)   Wt 125 lb (56.7 kg)   BMI 25.25 kg/m²     General: NAD, well-appearing  Neuro: No gross neuro deficits. CN's II-XII intact. No facial weakness. Eyes: EOMI. Pupils reactive. No periorbital edema/ecchymosis. No nystagmus. Skin: No facial erythema, rashes or concerning lesions. Nose: No external deviations or saddling. Intranasally, septum is midline without perforations, nasal mucosa appears healthy with no erythema, mucopurulence, or polyps. Mouth: Moist mucus membranes, normal tongue/palate mobility, no concerning mucosal lesions. Oropharynx clear with no erythema/exudate, no tonsillar hypertrophy. Ears: Normal appearing auricles, no hematomas. EACs clear with no cerumen impaction, healthy canal skin, TM's intact with no perforations or retraction pockets. No middle ear effusions. Neck: Soft, supple, no palpable lateral neck masses. No palpable parotid or submandibular masses. No thyromegaly or palpable thyroid nodules. No surgical scars. Lymphatics: No palpable cervical LAD. Resp: No audible stridor or wheezing. CV: No murmurs, no JVD. Extremities: No clubbing or cyanosis. PROCEDURE: Flexible laryngoscopy  INDICATIONS: Burning in throat  DESCRIPTION: After verbal consent was obtained and a timeout was performed, the flexible scope was advanced down one of the patient's nares into the nasopharynx. The nasal cavity and nasopharynx were clear. The scope was then turned distally down towards the oropharynx.  The BOT and vallecula were clear and the epiglottis was normal in appearance. Both aryepiglottic folds were clear and there was a normal appearing glottis w/ healthy TVCs. No nodules or polyps and the cords were fully mobile- there was some edema of both cords. No concerning lesions seen along post-cricoid region but there was some generalized edema. . The posterior pharyngeal was clear as well. The scope was then carefully removed. The patient tolerated the procedure well and there were no complications. ASSESSMENT and PLAN      ICD-10-CM    1. LPRD (laryngopharyngeal reflux disease)  K21.9       2. Throat burning  R07.0         Her laryngoscopy today revealed mildly edematous vocal cords bilaterally and there was some edema of the postcricoid region, consistent with LPRD. I did not see any concerning masses or lesions. She has had minimal improvement on 20 mg of Omeprazole and I will switch her to 40 mg of Nexium instead. I also reviewed some conservative lifestyle and dietary changes which may help with her symptoms. RTC in 6 weeks for re-evaluation.     Mariza Mccabe MD  10/19/2022    Electronically signed by Mariza Mccabe MD on 10/19/2022 at 11:45 AM

## 2022-11-10 ENCOUNTER — OFFICE VISIT (OUTPATIENT)
Dept: INTERNAL MEDICINE CLINIC | Facility: CLINIC | Age: 57
End: 2022-11-10
Payer: COMMERCIAL

## 2022-11-10 VITALS
OXYGEN SATURATION: 98 % | TEMPERATURE: 97.3 F | DIASTOLIC BLOOD PRESSURE: 70 MMHG | WEIGHT: 124 LBS | HEIGHT: 59 IN | SYSTOLIC BLOOD PRESSURE: 118 MMHG | BODY MASS INDEX: 25 KG/M2 | HEART RATE: 66 BPM

## 2022-11-10 DIAGNOSIS — M25.571 ACUTE RIGHT ANKLE PAIN: Primary | ICD-10-CM

## 2022-11-10 DIAGNOSIS — L98.9 SKIN LESION: ICD-10-CM

## 2022-11-10 PROCEDURE — 99213 OFFICE O/P EST LOW 20 MIN: CPT | Performed by: INTERNAL MEDICINE

## 2022-11-10 PROCEDURE — 3074F SYST BP LT 130 MM HG: CPT | Performed by: INTERNAL MEDICINE

## 2022-11-10 PROCEDURE — 3078F DIAST BP <80 MM HG: CPT | Performed by: INTERNAL MEDICINE

## 2022-11-10 ASSESSMENT — ENCOUNTER SYMPTOMS
BLOOD IN STOOL: 0
SHORTNESS OF BREATH: 0

## 2022-11-10 NOTE — PROGRESS NOTES
Edmund De Dios M.D. Internal Medicine  [facility]  [address], [address]  Phone: [phone]  Fax: [phone]    Ankle Pain   The incident occurred more than 1 week ago (Years ago. ). Injury mechanism: Fractured years ago jumping off a car. The pain is present in the right ankle. The quality of the pain is described as aching. The pain is at a severity of 3/10. The pain is moderate. The pain has been Constant since onset. Manuel Ohara is a 62 y.o. Black / [place] American female. Current Outpatient Medications   Medication Sig Dispense Refill    esomeprazole (NEXIUM) 40 MG delayed release capsule Take 1 capsule by mouth every morning (before breakfast) 30 capsule 5    busPIRone (BUSPAR) 10 MG tablet tid 90 tablet 5    umeclidinium-vilanterol (ANORO ELLIPTA) 62.5-25 MCG/INH AEPB inhaler Inhale 1 puff into the lungs in the morning. 30 each 5    aspirin 81 MG EC tablet Take 81 mg by mouth daily      buPROPion (WELLBUTRIN XL) 150 MG extended release tablet Take 150 mg by mouth      isosorbide mononitrate (IMDUR) 60 MG extended release tablet Take 60 mg by mouth daily      metoprolol succinate (TOPROL XL) 25 MG extended release tablet Take 25 mg by mouth daily      montelukast (SINGULAIR) 10 MG tablet Take 10 mg by mouth daily      nitroGLYCERIN (NITROSTAT) 0.4 MG SL tablet Place 0.4 mg under the tongue      omeprazole (PRILOSEC) 20 MG delayed release capsule Take 20 mg by mouth daily      ranolazine (RANEXA) 500 MG extended release tablet Take 500 mg by mouth 2 times daily      rosuvastatin (CRESTOR) 20 MG tablet Take 20 mg by mouth      ticagrelor (BRILINTA) 90 MG TABS tablet Take 90 mg by mouth 2 times daily       No current facility-administered medications for this visit.      No Known Allergies  Past Medical History:   Diagnosis Date    Anemia     not currently taking iron supplements; had hysterectomy    Arthritis     foot    CAD (coronary artery disease) 2/19/2021 GERD (gastroesophageal reflux disease)     no meds    History of chicken pox     Hypertension     pt denies    OAB (overactive bladder) 7/3/2014     Past Surgical History:   Procedure Laterality Date     SECTION      x3    COLONOSCOPY      DILATION AND CURETTAGE OF UTERUS      HYSTERECTOMY (CERVIX STATUS UNKNOWN)  2013    [facility]    LAP,TUBAL CAUTERY      ORTHOPEDIC SURGERY      right ankle    CT CARDIAC SURG PROCEDURE UNLIST      stent x 1---2-    TUBAL LIGATION       Social History     Tobacco Use    Smoking status: Every Day     Packs/day: 0.50     Types: Cigarettes     Start date: 1982    Smokeless tobacco: Never    Tobacco comments:     Quit smokin years   Substance Use Topics    Alcohol use: Yes     Alcohol/week: 1.0 standard drink    Drug use: No     Family History   Problem Relation Age of Onset    Ovarian Cancer Neg Hx     Breast Cancer Neg Hx     Cancer Neg Hx     Other Neg Hx     Post-op Cognitive Dysfunction Neg Hx     Emergence Delirium Neg Hx     Post-op Nausea/Vomiting Neg Hx     Delayed Awakening Neg Hx     Pseudochol. Deficiency Neg Hx     Malig Hypertherm Neg Hx     No Known Problems Mother     Heart Disease Father     Heart Attack Father 68    Colon Cancer Father 76    Hypertension Father     Diabetes Father       Review of Systems   Respiratory:  Negative for shortness of breath. Cardiovascular:  Negative for chest pain. Gastrointestinal:  Negative for blood in stool. Physical Exam  Vitals and nursing note reviewed. Constitutional:       General: She is not in acute distress. Appearance: Normal appearance. She is not ill-appearing, toxic-appearing or diaphoretic. HENT:      Head: Normocephalic. Right Ear: External ear normal.      Left Ear: External ear normal.      Nose: Nose normal. No congestion or rhinorrhea. Eyes:      General:         Right eye: No discharge. Left eye: No discharge.       Extraocular Movements: Extraocular movements intact. Conjunctiva/sclera: Conjunctivae normal.   Pulmonary:      Effort: Pulmonary effort is normal. No respiratory distress. Musculoskeletal:         General: No swelling. Cervical back: Normal range of motion. Right lower leg: No edema. Left lower leg: No edema. Right ankle: Normal.   Skin:     Findings: Lesion present. Comments: 1/2 inch diameter mass on the outside of the right ankle. Neurological:      General: No focal deficit present. Mental Status: She is alert and oriented to person, place, and time. Psychiatric:         Mood and Affect: Mood normal.         Behavior: Behavior normal.         Thought Content: Thought content normal.         Judgment: Judgment normal.      ASSESSMENT AND PLAN:         Kwaku Grace was seen today for ankle pain. Diagnoses and all orders for this visit:    Acute right ankle pain  -     XR ANKLE RIGHT (2 VIEWS); Future    Skin lesion  -     [facility] - Cassie Gaxiola MD, PA    Return if symptoms worsen or fail to improve.

## 2022-12-16 ENCOUNTER — OFFICE VISIT (OUTPATIENT)
Dept: ENT CLINIC | Age: 57
End: 2022-12-16
Payer: COMMERCIAL

## 2022-12-16 VITALS — HEIGHT: 59 IN | RESPIRATION RATE: 18 BRPM | WEIGHT: 123 LBS | BODY MASS INDEX: 24.8 KG/M2

## 2022-12-16 DIAGNOSIS — K21.9 LPRD (LARYNGOPHARYNGEAL REFLUX DISEASE): Primary | ICD-10-CM

## 2022-12-16 PROCEDURE — 99213 OFFICE O/P EST LOW 20 MIN: CPT | Performed by: OTOLARYNGOLOGY

## 2022-12-16 RX ORDER — FAMOTIDINE 20 MG/1
20 TABLET, FILM COATED ORAL
Qty: 60 TABLET | Refills: 3 | Status: SHIPPED | OUTPATIENT
Start: 2022-12-16

## 2022-12-16 ASSESSMENT — ENCOUNTER SYMPTOMS
SHORTNESS OF BREATH: 0
ABDOMINAL PAIN: 0

## 2022-12-16 NOTE — PROGRESS NOTES
Chief Complaint   Patient presents with    Follow-up     Patient is here for her follow up and states that she is frannie le. HPI:  Tim Carver is a 62 y.o. female seen in follow-up for her throat. I had seen her back on 10/19/2022 for suspected LPRD. Her endoscopy at that time revealed mild edema of both vocal cords and some reflux changes. I changed her PPI dose at that time to 40 mg of Nexium daily. Today, she is doing slightly better but continues to complain of a burning sensation in her throat, especially in the mornings. She has been watching her diet and not eating meals late at night. She denies any sore throat or dysphagia today. Past Medical History, Past Surgical History, Family history, Social History, and Medications were all reviewed with the patient today and updated as necessary. No Known Allergies  Patient Active Problem List   Diagnosis    Coronary artery disease involving native coronary artery of native heart without angina pectoris    Essential hypertension    Mixed hyperlipidemia    Tobacco use    Benign essential tremor    Atherosclerosis of abdominal aorta (HCC)    Panlobular emphysema (HCC)     Current Outpatient Medications   Medication Sig    famotidine (PEPCID) 20 MG tablet Take 1 tablet by mouth nightly    esomeprazole (NEXIUM) 40 MG delayed release capsule Take 1 capsule by mouth every morning (before breakfast)    busPIRone (BUSPAR) 10 MG tablet tid    umeclidinium-vilanterol (ANORO ELLIPTA) 62.5-25 MCG/INH AEPB inhaler Inhale 1 puff into the lungs in the morning.     aspirin 81 MG EC tablet Take 81 mg by mouth daily    buPROPion (WELLBUTRIN XL) 150 MG extended release tablet Take 150 mg by mouth    isosorbide mononitrate (IMDUR) 60 MG extended release tablet Take 60 mg by mouth daily    metoprolol succinate (TOPROL XL) 25 MG extended release tablet Take 25 mg by mouth daily    montelukast (SINGULAIR) 10 MG tablet Take 10 mg by mouth daily    nitroGLYCERIN (NITROSTAT) 0.4 MG SL tablet Place 0.4 mg under the tongue    omeprazole (PRILOSEC) 20 MG delayed release capsule Take 20 mg by mouth daily    ranolazine (RANEXA) 500 MG extended release tablet Take 500 mg by mouth 2 times daily    rosuvastatin (CRESTOR) 20 MG tablet Take 20 mg by mouth    ticagrelor (BRILINTA) 90 MG TABS tablet Take 90 mg by mouth 2 times daily     No current facility-administered medications for this visit. Past Medical History:   Diagnosis Date    Anemia     not currently taking iron supplements; had hysterectomy    Arthritis     foot    CAD (coronary artery disease) 2021    GERD (gastroesophageal reflux disease)     no meds    History of chicken pox     Hypertension     pt denies    OAB (overactive bladder) 7/3/2014     Social History     Tobacco Use    Smoking status: Every Day     Packs/day: 0.50     Types: Cigarettes     Start date: 1982    Smokeless tobacco: Never    Tobacco comments:     Quit smokin years   Substance Use Topics    Alcohol use: Yes     Alcohol/week: 1.0 standard drink     Past Surgical History:   Procedure Laterality Date     SECTION      x3    COLONOSCOPY      DILATION AND CURETTAGE OF UTERUS      HYSTERECTOMY (CERVIX STATUS UNKNOWN)  2013    [facility]    LAP,TUBAL CAUTERY      ORTHOPEDIC SURGERY      right ankle    SC CARDIAC SURG PROCEDURE UNLIST      stent x 1---    TUBAL LIGATION       Family History   Problem Relation Age of Onset    Ovarian Cancer Neg Hx     Breast Cancer Neg Hx     Cancer Neg Hx     Other Neg Hx     Post-op Cognitive Dysfunction Neg Hx     Emergence Delirium Neg Hx     Post-op Nausea/Vomiting Neg Hx     Delayed Awakening Neg Hx     Pseudochol. Deficiency Neg Hx     Malig Hypertherm Neg Hx     No Known Problems Mother     Heart Disease Father     Heart Attack Father 68    Colon Cancer Father 76    Hypertension Father     Diabetes Father         ROS:    Review of Systems   Constitutional:  Negative for fever.    Eyes: Negative for visual disturbance. Respiratory:  Negative for shortness of breath. Cardiovascular:  Negative for chest pain. Gastrointestinal:  Negative for abdominal pain. Skin:  Negative for rash. Neurological:  Negative for dizziness and headaches. Hematological:  Negative for adenopathy. Psychiatric/Behavioral:  Negative for agitation. PHYSICAL EXAM:    Resp 18   Ht 4' 11\" (1.499 m)   Wt 123 lb (55.8 kg)   BMI 24.84 kg/m²     General: NAD, well-appearing  Neuro: No gross neuro deficits. No facial weakness. Eyes: No periorbital edema/ecchymosis. No nystagmus. Skin: No facial erythema, rashes or concerning lesions. Nose: No external deviations or saddling. Intranasally, septum is midline without perforations, nasal mucosa appears healthy with no erythema, mucopurulence, or polyps. Mouth: Moist mucus membranes, normal tongue/palate mobility, no concerning mucosal lesions. Oropharynx clear with no erythema/exudate, no tonsillar hypertrophy. Ears: Normal appearing auricles, no hematomas. EACs clear with no cerumen impaction, healthy canal skin, TM's intact with no perforations or retraction pockets. No middle ear effusions. Neck: Soft, supple, no palpable neck masses. No palpable parotid or submandibular masses. No thyromegaly or palpable thyroid nodules. Lymphatics: No palpable cervical LAD. Resp: No audible stridor or wheezing. CV: No murmurs, no JVD. Extremities: No clubbing or cyanosis. ASSESSMENT and PLAN      ICD-10-CM    1. LPRD (laryngopharyngeal reflux disease)  K21.9         Her exam today was clear and she has had mild clinical improvement with the increased dose of her Nexium. I added Pepcid 20 mg nightly, and hopefully this will help with the continued burning sensation in her throat in the mornings. RTC in 2 months for reevaluation.     Florencia Herrera MD  12/16/2022    Electronically signed by Florencia Herrera MD on 12/16/2022 at 12:03 PM

## 2023-02-15 ENCOUNTER — OFFICE VISIT (OUTPATIENT)
Dept: INTERNAL MEDICINE CLINIC | Facility: CLINIC | Age: 58
End: 2023-02-15
Payer: COMMERCIAL

## 2023-02-15 VITALS
SYSTOLIC BLOOD PRESSURE: 126 MMHG | DIASTOLIC BLOOD PRESSURE: 81 MMHG | HEIGHT: 59 IN | TEMPERATURE: 97.7 F | BODY MASS INDEX: 25 KG/M2 | WEIGHT: 124 LBS | HEART RATE: 68 BPM | OXYGEN SATURATION: 96 %

## 2023-02-15 DIAGNOSIS — Z87.891 PERSONAL HISTORY OF TOBACCO USE: ICD-10-CM

## 2023-02-15 DIAGNOSIS — R73.9 HYPERGLYCEMIA: ICD-10-CM

## 2023-02-15 DIAGNOSIS — Z13.820 SCREENING FOR OSTEOPOROSIS: ICD-10-CM

## 2023-02-15 DIAGNOSIS — M25.561 CHRONIC PAIN OF BOTH KNEES: ICD-10-CM

## 2023-02-15 DIAGNOSIS — Z78.0 POST-MENOPAUSAL: ICD-10-CM

## 2023-02-15 DIAGNOSIS — I70.0 ATHEROSCLEROSIS OF ABDOMINAL AORTA (HCC): ICD-10-CM

## 2023-02-15 DIAGNOSIS — I10 ESSENTIAL HYPERTENSION: ICD-10-CM

## 2023-02-15 DIAGNOSIS — E78.2 MIXED HYPERLIPIDEMIA: ICD-10-CM

## 2023-02-15 DIAGNOSIS — G89.29 CHRONIC PAIN OF BOTH KNEES: ICD-10-CM

## 2023-02-15 DIAGNOSIS — M25.562 CHRONIC PAIN OF BOTH KNEES: ICD-10-CM

## 2023-02-15 DIAGNOSIS — I25.10 CORONARY ARTERY DISEASE INVOLVING NATIVE CORONARY ARTERY OF NATIVE HEART WITHOUT ANGINA PECTORIS: Primary | ICD-10-CM

## 2023-02-15 DIAGNOSIS — J43.1 PANLOBULAR EMPHYSEMA (HCC): ICD-10-CM

## 2023-02-15 DIAGNOSIS — I25.10 CORONARY ARTERY DISEASE INVOLVING NATIVE CORONARY ARTERY OF NATIVE HEART WITHOUT ANGINA PECTORIS: ICD-10-CM

## 2023-02-15 PROBLEM — G25.0 BENIGN ESSENTIAL TREMOR: Status: RESOLVED | Noted: 2018-03-15 | Resolved: 2023-02-15

## 2023-02-15 LAB
BASOPHILS # BLD: 0 K/UL (ref 0–0.2)
BASOPHILS NFR BLD: 1 % (ref 0–2)
DIFFERENTIAL METHOD BLD: ABNORMAL
EOSINOPHIL # BLD: 0.1 K/UL (ref 0–0.8)
EOSINOPHIL NFR BLD: 2 % (ref 0.5–7.8)
ERYTHROCYTE [DISTWIDTH] IN BLOOD BY AUTOMATED COUNT: 15.7 % (ref 11.9–14.6)
HCT VFR BLD AUTO: 38.4 % (ref 35.8–46.3)
HGB BLD-MCNC: 12.3 G/DL (ref 11.7–15.4)
IMM GRANULOCYTES # BLD AUTO: 0 K/UL (ref 0–0.5)
IMM GRANULOCYTES NFR BLD AUTO: 0 % (ref 0–5)
LYMPHOCYTES # BLD: 2.8 K/UL (ref 0.5–4.6)
LYMPHOCYTES NFR BLD: 49 % (ref 13–44)
MCH RBC QN AUTO: 23.2 PG (ref 26.1–32.9)
MCHC RBC AUTO-ENTMCNC: 32 G/DL (ref 31.4–35)
MCV RBC AUTO: 72.5 FL (ref 82–102)
MONOCYTES # BLD: 0.5 K/UL (ref 0.1–1.3)
MONOCYTES NFR BLD: 10 % (ref 4–12)
NEUTS SEG # BLD: 2.1 K/UL (ref 1.7–8.2)
NEUTS SEG NFR BLD: 38 % (ref 43–78)
NRBC # BLD: 0 K/UL (ref 0–0.2)
PLATELET # BLD AUTO: 177 K/UL (ref 150–450)
PMV BLD AUTO: 10.9 FL (ref 9.4–12.3)
RBC # BLD AUTO: 5.3 M/UL (ref 4.05–5.2)
WBC # BLD AUTO: 5.5 K/UL (ref 4.3–11.1)

## 2023-02-15 PROCEDURE — 99214 OFFICE O/P EST MOD 30 MIN: CPT | Performed by: INTERNAL MEDICINE

## 2023-02-15 PROCEDURE — G0296 VISIT TO DETERM LDCT ELIG: HCPCS | Performed by: INTERNAL MEDICINE

## 2023-02-15 PROCEDURE — 3079F DIAST BP 80-89 MM HG: CPT | Performed by: INTERNAL MEDICINE

## 2023-02-15 PROCEDURE — 3074F SYST BP LT 130 MM HG: CPT | Performed by: INTERNAL MEDICINE

## 2023-02-15 RX ORDER — BUPROPION HYDROCHLORIDE 150 MG/1
150 TABLET ORAL EVERY MORNING
Qty: 90 TABLET | Refills: 1 | Status: SHIPPED | OUTPATIENT
Start: 2023-02-15

## 2023-02-15 SDOH — ECONOMIC STABILITY: INCOME INSECURITY: HOW HARD IS IT FOR YOU TO PAY FOR THE VERY BASICS LIKE FOOD, HOUSING, MEDICAL CARE, AND HEATING?: NOT HARD AT ALL

## 2023-02-15 SDOH — ECONOMIC STABILITY: HOUSING INSECURITY
IN THE LAST 12 MONTHS, WAS THERE A TIME WHEN YOU DID NOT HAVE A STEADY PLACE TO SLEEP OR SLEPT IN A SHELTER (INCLUDING NOW)?: NO

## 2023-02-15 SDOH — ECONOMIC STABILITY: FOOD INSECURITY: WITHIN THE PAST 12 MONTHS, THE FOOD YOU BOUGHT JUST DIDN'T LAST AND YOU DIDN'T HAVE MONEY TO GET MORE.: NEVER TRUE

## 2023-02-15 SDOH — ECONOMIC STABILITY: FOOD INSECURITY: WITHIN THE PAST 12 MONTHS, YOU WORRIED THAT YOUR FOOD WOULD RUN OUT BEFORE YOU GOT MONEY TO BUY MORE.: NEVER TRUE

## 2023-02-15 ASSESSMENT — PATIENT HEALTH QUESTIONNAIRE - PHQ9
SUM OF ALL RESPONSES TO PHQ9 QUESTIONS 1 & 2: 0
SUM OF ALL RESPONSES TO PHQ QUESTIONS 1-9: 0
SUM OF ALL RESPONSES TO PHQ QUESTIONS 1-9: 0
1. LITTLE INTEREST OR PLEASURE IN DOING THINGS: 0
SUM OF ALL RESPONSES TO PHQ QUESTIONS 1-9: 0
2. FEELING DOWN, DEPRESSED OR HOPELESS: 0
SUM OF ALL RESPONSES TO PHQ QUESTIONS 1-9: 0

## 2023-02-15 ASSESSMENT — ANXIETY QUESTIONNAIRES
GAD7 TOTAL SCORE: 0
6. BECOMING EASILY ANNOYED OR IRRITABLE: 0
IF YOU CHECKED OFF ANY PROBLEMS ON THIS QUESTIONNAIRE, HOW DIFFICULT HAVE THESE PROBLEMS MADE IT FOR YOU TO DO YOUR WORK, TAKE CARE OF THINGS AT HOME, OR GET ALONG WITH OTHER PEOPLE: NOT DIFFICULT AT ALL
7. FEELING AFRAID AS IF SOMETHING AWFUL MIGHT HAPPEN: 0
1. FEELING NERVOUS, ANXIOUS, OR ON EDGE: 0
2. NOT BEING ABLE TO STOP OR CONTROL WORRYING: 0
5. BEING SO RESTLESS THAT IT IS HARD TO SIT STILL: 0
4. TROUBLE RELAXING: 0
3. WORRYING TOO MUCH ABOUT DIFFERENT THINGS: 0

## 2023-02-15 ASSESSMENT — ENCOUNTER SYMPTOMS
SHORTNESS OF BREATH: 0
BLOOD IN STOOL: 0

## 2023-02-15 NOTE — PROGRESS NOTES
Mark Gates M.D. Internal Medicine  [address], [address]  Phone: [phone]  Fax: [phone]    Hypertension  This is a chronic problem. The current episode started more than 1 year ago. The problem has been waxing and waning since onset. The problem is controlled. Pertinent negatives include no chest pain or shortness of breath. There are no associated agents to hypertension. Risk factors for coronary artery disease include dyslipidemia and post-menopausal state. Past treatments include beta blockers. The current treatment provides moderate improvement. Nito Ross is a 62 y.o. Black / [place] American female. Current Outpatient Medications   Medication Sig Dispense Refill    famotidine (PEPCID) 20 MG tablet Take 1 tablet by mouth nightly 60 tablet 3    esomeprazole (NEXIUM) 40 MG delayed release capsule Take 1 capsule by mouth every morning (before breakfast) 30 capsule 5    busPIRone (BUSPAR) 10 MG tablet tid 90 tablet 5    umeclidinium-vilanterol (ANORO ELLIPTA) 62.5-25 MCG/INH AEPB inhaler Inhale 1 puff into the lungs in the morning. 30 each 5    aspirin 81 MG EC tablet Take 81 mg by mouth daily      buPROPion (WELLBUTRIN XL) 150 MG extended release tablet Take 150 mg by mouth      isosorbide mononitrate (IMDUR) 60 MG extended release tablet Take 60 mg by mouth daily      metoprolol succinate (TOPROL XL) 25 MG extended release tablet Take 25 mg by mouth daily      montelukast (SINGULAIR) 10 MG tablet Take 10 mg by mouth daily      nitroGLYCERIN (NITROSTAT) 0.4 MG SL tablet Place 0.4 mg under the tongue      ranolazine (RANEXA) 500 MG extended release tablet Take 500 mg by mouth 2 times daily      rosuvastatin (CRESTOR) 20 MG tablet Take 20 mg by mouth      ticagrelor (BRILINTA) 90 MG TABS tablet Take 90 mg by mouth 2 times daily       No current facility-administered medications for this visit.      No Known Allergies    Past Medical History: Diagnosis Date    Anemia     not currently taking iron supplements; had hysterectomy    Arthritis     foot    CAD (coronary artery disease) 2021    GERD (gastroesophageal reflux disease)     no meds    History of chicken pox     Hypertension     pt denies    OAB (overactive bladder) 7/3/2014     Past Surgical History:   Procedure Laterality Date     SECTION      x3    COLONOSCOPY      DILATION AND CURETTAGE OF UTERUS      HYSTERECTOMY (CERVIX STATUS UNKNOWN)  2013    [facility]    LAP,TUBAL CAUTERY      ORTHOPEDIC SURGERY      right ankle    VT CARDIAC SURG PROCEDURE UNLIST      stent x 1---    TUBAL LIGATION       Social History     Tobacco Use    Smoking status: Every Day     Packs/day: 0.50     Types: Cigarettes     Start date: 1982    Smokeless tobacco: Never    Tobacco comments:     Quit smokin years   Substance Use Topics    Alcohol use: Yes     Alcohol/week: 1.0 standard drink    Drug use: No     Family History   Problem Relation Age of Onset    Ovarian Cancer Neg Hx     Breast Cancer Neg Hx     Cancer Neg Hx     Other Neg Hx     Post-op Cognitive Dysfunction Neg Hx     Emergence Delirium Neg Hx     Post-op Nausea/Vomiting Neg Hx     Delayed Awakening Neg Hx     Pseudochol. Deficiency Neg Hx     Malig Hypertherm Neg Hx     No Known Problems Mother     Heart Disease Father     Heart Attack Father 68    Colon Cancer Father 76    Hypertension Father     Diabetes Father       Review of Systems   Respiratory:  Negative for shortness of breath. Cardiovascular:  Negative for chest pain. Gastrointestinal:  Negative for blood in stool. Physical Exam  Vitals and nursing note reviewed. Constitutional:       General: She is not in acute distress. Appearance: Normal appearance. She is not ill-appearing, toxic-appearing or diaphoretic. HENT:      Head: Normocephalic and atraumatic.       Right Ear: External ear normal.      Left Ear: External ear normal.   Eyes:      General: No scleral icterus. Right eye: No discharge. Left eye: No discharge. Conjunctiva/sclera: Conjunctivae normal.   Cardiovascular:      Rate and Rhythm: Normal rate and regular rhythm. Heart sounds: Normal heart sounds. No murmur heard. No friction rub. No gallop. Pulmonary:      Effort: Pulmonary effort is normal. No respiratory distress. Breath sounds: Normal breath sounds. No stridor. No wheezing, rhonchi or rales. Abdominal:      General: Abdomen is flat. Bowel sounds are normal. There is no distension. Palpations: Abdomen is soft. There is no mass. Tenderness: There is no abdominal tenderness. There is no guarding or rebound. Musculoskeletal:      Right lower leg: No edema. Left lower leg: No edema. Skin:     General: Skin is warm and dry. Coloration: Skin is not jaundiced or pale. Findings: No erythema. Neurological:      General: No focal deficit present. Mental Status: She is alert and oriented to person, place, and time. Mental status is at baseline. Psychiatric:         Mood and Affect: Mood normal.         Behavior: Behavior normal.         Thought Content: Thought content normal.         Judgment: Judgment normal.   I have reviewed/discussed the above normal BMI with the patient. I have recommended the following interventions: dietary management education, guidance, and counseling and encourage exercise . ASSESSMENT AND PLAN:    CAD: KELLY to LAD 2/19/21. Follows with Cardiology (Dr. Paulie Haddad). Maintained on Asa/Brilinta. Risk factors medically modified per below. Taking medications w/o problems. Well controlled w/o angina or STRATTON. Continue Asa/Brilinta (No bleeding) per Cardiology. Continue risk factor modifications per below. Follow up with Dr. Paulie Haddad (Also with aortic calcifications). HTN: Taking current regimen (Toprol XL 25) w/o problems. Home BPs = Not being checked. Well controlled. Continue current regimen.   Asked to monitor BP at home, call me if it runs above 140/80, and bring in a log next time. HLD: Taking current regimen (Crestor 20) w/o problems. Well controlled. Continue current regimen. FLPs:  3/15/18 Untreated = [217/119/87/53]. 11/25/20 Untreated = [185/97/79/48] ==> Crestor 20.   8/10/22 on Crestor 20 = [176/73.2/93/49]. COPD: PFTs 2/9/22 = Severe COPD. Non-compliant with current regimen (Anoro). Recommend compliance with Anoro to lower risk for morbidity/mortality. Recommend tobacco cessation. HCM:   Colonoscopy: 6/12/15 by Dr. Princess De La Paz = TA. This is on hold after PCI. Mammogram: Follows with Gyn.  4/21/22 = Neg. Pap Smear: Follows with Gyn (Dr. Ermias Pickett). BMD: Follows with Gyn. Flu: Elsewhere 2022. Covid: Recommend staying UTD on Covid vaccinations/boosters. F/u: 6 Months. Fasting labs at that visit. Lung Nodules: Repeat CT ordered today. Lesley Lemos was seen today for hypertension and cholesterol problem. Diagnoses and all orders for this visit:    Coronary artery disease involving native coronary artery of native heart without angina pectoris  -     Basic Metabolic Panel; Future  -     CBC with Auto Differential; Future  -     Hepatic Function Panel; Future    Essential hypertension  -     Basic Metabolic Panel; Future  -     CBC with Auto Differential; Future  -     Hepatic Function Panel; Future    Mixed hyperlipidemia  -     Basic Metabolic Panel; Future  -     CBC with Auto Differential; Future  -     Hepatic Function Panel; Future    Panlobular emphysema (HCC)    Atherosclerosis of abdominal aorta ([facility] [address]  -     Basic Metabolic Panel; Future  -     CBC with Auto Differential; Future  -     Hepatic Function Panel; Future    Hyperglycemia  -     Basic Metabolic Panel; Future  -     Hemoglobin A1C; Future    Personal history of tobacco use  -     [place] VISIT TO DISCUSS LUNG CA SCREEN W LDCT  -     CT Lung Screen (Annual/Baseline);  Future  -     buPROPion (WELLBUTRIN XL) 150 MG extended release tablet; Take 1 tablet by mouth every morning    Chronic pain of both knees  -     [facility] [address] [place] [address]    Post-menopausal  -     DEXA BONE DENSITY AXIAL SKELETON; Future    Screening for osteoporosis  -     DEXA BONE DENSITY AXIAL SKELETON; Future    No follow-ups on file. Discussed with the patient the current USPSTF guidelines released March 9, 2021 for screening for lung cancer. For adults aged 48 to [de-identified] years who have a 20 pack-year smoking history and currently smoke or have quit within the past 15 years the grade B recommendation is to:  Screen for lung cancer with low-dose computed tomography (LDCT) every year. Stop screening once a person has not smoked for 15 years or has a health problem that limits life expectancy or the ability to have lung surgery. The patient  reports that she has been smoking cigarettes. She started smoking about 41 years ago. She has a 20.00 pack-year smoking history. She has never used smokeless tobacco.. Discussed with patient the risks and benefits of screening, including over-diagnosis, false positive rate, and total radiation exposure. The patient currently exhibits no signs or symptoms suggestive of lung cancer. Discussed with patient the importance of compliance with yearly annual lung cancer screenings and willingness to undergo diagnosis and treatment if screening scan is positive. In addition, the patient was counseled regarding the importance of remaining smoke free and/or total smoking cessation.     Also reviewed the following if the patient has Medicare that as of February 10, 2022, Medicare only covers LDCT screening in patients aged 51-72 with at least a 20 pack-year smoking history who currently smoke or have quit in the last 15 years

## 2023-02-15 NOTE — PATIENT INSTRUCTIONS
Please arrive 20 minutes prior to your scheduled time to see the doctor to allow sufficient time for check-in and rooming. I recommend all standard healthcare maintenance and cancer screenings (Colon cancer screening, Mammogram and Bone Density if female and appropriate, etc) and standard immunizations (Flu, Pneumonia, Covid-19, Tetanus boosters, etc) as appropriate and due to lower your risk for potentially preventable morbidity/mortality from these diseases. Recommend tobacco cessation. Learning About Lung Cancer Screening  What is screening for lung cancer? Lung cancer screening is a way to find some lung cancers early, before a person has any symptoms of the cancer. Lung cancer screening may help those who have the highest risk for lung cancer--people age 48 and older who are or were heavy smokers. For most people, who aren't at increased risk, screening for lung cancer probably isn't helpful. Screening won't prevent cancer. And it may not find all lung cancers. Lung cancer screening may lower the risk of dying from lung cancer in a small number of people. How is it done? Lung cancer screening is done with a low-dose CT (computed tomography) scan. A CT scan uses X-rays, or radiation, to make detailed pictures of your body. Experts recommend that screening be done in medical centers that focus on finding and treating lung cancer. Who is screening recommended for? Lung cancer screening is recommended for people age 48 and older who are or were heavy smokers. That means people with a smoking history of at least 20 pack years. A pack year is a way to measure how heavy a smoker you are or were. To figure out your pack years, multiply how many packs a day on average (assuming 20 cigarettes per pack) you have smoked by how many years you have smoked. For example: If you smoked 1 pack a day for 20 years, that's 1 times 20. So you have a smoking history of 20 pack years.   If you smoked 2 packs a day for 10 years, that's 2 times 10. So you have a smoking history of 20 pack years. Experts agree that screening is for people who have a high risk of lung cancer. But experts don't agree on what high risk means. Some say people age 48 or older with at least a 20-pack-year smoking history are high risk. Others say it's people age 54 or older with a 30-pack-year history. To see if you could benefit from screening, first find out if you are at high risk for lung cancer. Your doctor can help you decide your lung cancer risk. What are the risks of screening? CT screening for lung cancer isn't perfect. It can show an abnormal result when it turns out there wasn't any cancer. This is called a false-positive result. This means you may need more tests to make sure you don't have cancer. These tests can be harmful and cause a lot of worry. These tests may include more CT scans and invasive testing like a lung biopsy. In a biopsy, the doctor takes a sample of tissue from inside your lung so it can be looked at under a microscope. A biopsy is the only way to tell if you have lung cancer. If the biopsy finds cancer, you and your doctor will have to decide how or whether to treat it. Some lung cancers found on CT scans are harmless and would not have caused a problem if they had not been found through screening. But because doctors can't tell which ones will turn out to be harmless, most will be treated. This means that you may get treatment--including surgery, radiation, or chemotherapy--that you don't need. There is a risk of damage to cells or tissue from being exposed to radiation, including the small amounts used in CTs, X-rays, and other medical tests. Over time, exposure to radiation may cause cancer and other health problems. But in most cases, the risk of getting cancer from being exposed to small amounts of radiation is low. It's not a reason to avoid these tests for most people.   What are the benefits of screening? Your scan may be normal (negative). For some people who are at higher risk, screening lowers the chance of dying of lung cancer. How much and how long you smoked helps to determine your risk level. Screening can find some cancers early, when treatment may be more likely to work. What happens after screening? The results of your CT scan will be sent to your doctor. Someone from your care team will explain the results of your scan and answer any questions you may have. If you need any follow-up, he or she will help you understand what to do next. After a lung cancer screening, you can go back to your usual activities right away. A lung cancer screening test can't tell if you have lung cancer. If your results are positive, your doctor can't tell whether an abnormal finding is a harmless nodule, cancer, or something else without doing more tests. What can you do to help prevent lung cancer? Some lung cancers can't be prevented. But if you smoke, quitting smoking is the best step you can take to prevent lung cancer. If you want to quit, your doctor can recommend medicines or other ways to help. Follow-up care is a key part of your treatment and safety. Be sure to make and go to all appointments, and call your doctor if you are having problems. It's also a good idea to know your test results and keep a list of the medicines you take. Where can you learn more? Go to http://www.maldonado.com/ and enter Q940 to learn more about \"Learning About Lung Cancer Screening. \"  Current as of: May 4, 2022               Content Version: 13.5  © 9445-8403 [employer]. Care instructions adapted under license by [facility] ([facility]). If you have questions about a medical condition or this instruction, always ask your healthcare professional. [place] [address] any warranty or liability for your use of this information.

## 2023-02-16 LAB
ALBUMIN SERPL-MCNC: 3.5 G/DL (ref 3.5–5)
ALBUMIN/GLOB SERPL: 1.2 (ref 0.4–1.6)
ALP SERPL-CCNC: 54 U/L (ref 50–136)
ALT SERPL-CCNC: 30 U/L (ref 12–65)
ANION GAP SERPL CALC-SCNC: 7 MMOL/L (ref 2–11)
AST SERPL-CCNC: 15 U/L (ref 15–37)
BILIRUB DIRECT SERPL-MCNC: <0.1 MG/DL
BILIRUB SERPL-MCNC: 0.3 MG/DL (ref 0.2–1.1)
BUN SERPL-MCNC: 17 MG/DL (ref 6–23)
CALCIUM SERPL-MCNC: 8.9 MG/DL (ref 8.3–10.4)
CHLORIDE SERPL-SCNC: 111 MMOL/L (ref 101–110)
CO2 SERPL-SCNC: 26 MMOL/L (ref 21–32)
CREAT SERPL-MCNC: 0.7 MG/DL (ref 0.6–1)
EST. AVERAGE GLUCOSE BLD GHB EST-MCNC: 111 MG/DL
GLOBULIN SER CALC-MCNC: 3 G/DL (ref 2.8–4.5)
GLUCOSE SERPL-MCNC: 97 MG/DL (ref 65–100)
HBA1C MFR BLD: 5.5 % (ref 4.8–5.6)
POTASSIUM SERPL-SCNC: 4.2 MMOL/L (ref 3.5–5.1)
PROT SERPL-MCNC: 6.5 G/DL (ref 6.3–8.2)
SODIUM SERPL-SCNC: 144 MMOL/L (ref 133–143)

## 2023-02-22 ENCOUNTER — OFFICE VISIT (OUTPATIENT)
Dept: ORTHOPEDIC SURGERY | Age: 58
End: 2023-02-22
Payer: COMMERCIAL

## 2023-02-22 DIAGNOSIS — M25.562 PAIN IN BOTH KNEES, UNSPECIFIED CHRONICITY: Primary | ICD-10-CM

## 2023-02-22 DIAGNOSIS — M22.2X1 PATELLOFEMORAL DISORDER OF BOTH KNEES: ICD-10-CM

## 2023-02-22 DIAGNOSIS — M25.561 PAIN IN BOTH KNEES, UNSPECIFIED CHRONICITY: Primary | ICD-10-CM

## 2023-02-22 DIAGNOSIS — M22.2X2 PATELLOFEMORAL DISORDER OF BOTH KNEES: ICD-10-CM

## 2023-02-22 PROCEDURE — 99214 OFFICE O/P EST MOD 30 MIN: CPT | Performed by: SPECIALIST/TECHNOLOGIST

## 2023-02-22 NOTE — PROGRESS NOTES
Name: Gerardo Fleming  YOB: 1965  Gender: female  MRN: [id number]      CC: Knee Pain (B Knee )       HPI: Gerardo Fleming is a 62 y.o. female who presents with Knee Pain (B Knee )  Tilman Jeans is a new patient who reports today with B knee instability, which is her chief complaint. She reports her knees have bothered her for a while, however recently she has been having bouts of instability when she is walking where her knees will buckle and give out on her. She says sometimes it feels like the joint \"slips out\". She doesn't really have pain, however at times she does experience burning pain in the back of her knees depending on the position she is sitting in. She denies paresthesia. She has a very active job, and this instability can sometimes be an issue. She has not taken any over the counters or sought formal treatment before today. .       ROS/Meds/PSH/PMH/FH/SH: I personally reviewed the patients standard intake form. Below are the pertinents    Tobacco:  reports that she has been smoking cigarettes. She started smoking about 41 years ago. She has a 20.00 pack-year smoking history. She has never used smokeless tobacco.  Diabetes: none  Other: HTN, CAD    Physical Examination:  General: no acute distress  Lungs: breathing easily  CV: regular rhythm by pulse  Right Knee: Negative effusion present. No tenderness to palpation. Full active and passive range of motion with pain in the extremes of extension. Negative ligamentous exam x4. Pain with patellofemoral capture. Negative Ladi's, bounce home test.  Negative patellar apprehension test.  Left knee: Negative effusion present. No tenderness to palpation. Full active and passive range of motion with no pain in the extremes. Negative ligamentous exam x4. Pain with patellofemoral capture. Negative Ladi's, bounce home test.  Negative patellar apprehension test.      Imaging:   Knee XR: 4 views     Clinical Indication  1.  Pain in both knees, unspecified chronicity    2. Patellofemoral disorder of both knees           Report: AP, lateral, PA flexion, sunrise views of the Bilateral knee demonstrates no acute fracture dislocation, or advanced degenerative changes    Impression: No acute findings as above            All imaging interpreted by myself GIOVANNA Sun independent of radiology review        Assessment:   1. Pain in both knees, unspecified chronicity         Plan:   I think the majority of the patient's bilateral knee pain is due to patellofemoral disorder. I have low concern for any internal derangement of the knee at this time therefore I would not order any advanced imaging. I discussed with the patient that I think physical therapy would be the mainstay of her treatment and we ordered this for her today. If she does not get improvement with formal physical therapy then we will consider advanced imaging at that time.         Viki Romero MS, APRN, FNP-BC  Orthopaedics and Sports Medicine

## 2023-03-06 NOTE — TELEPHONE ENCOUNTER
Patient is due for an appointment with Dr. Luis Montiel, last seen on 03/03/2022 and was to return in about six. Note sent to scheduling to coordinate a follow-up appointment.

## 2023-03-06 NOTE — TELEPHONE ENCOUNTER
Called patient and have her scheduled for April 13th. Patient was just wondering if she can still get her medication.

## 2023-03-07 RX ORDER — METOPROLOL SUCCINATE 25 MG/1
TABLET, EXTENDED RELEASE ORAL
Qty: 90 TABLET | Refills: 0 | Status: SHIPPED | OUTPATIENT
Start: 2023-03-07

## 2023-03-14 ENCOUNTER — OFFICE VISIT (OUTPATIENT)
Age: 58
End: 2023-03-14
Payer: COMMERCIAL

## 2023-03-14 DIAGNOSIS — M25.469 KNEE SWELLING: ICD-10-CM

## 2023-03-14 DIAGNOSIS — Z74.09 IMPAIRED FUNCTIONAL MOBILITY, BALANCE, GAIT, AND ENDURANCE: ICD-10-CM

## 2023-03-14 DIAGNOSIS — M25.562 PAIN IN BOTH KNEES, UNSPECIFIED CHRONICITY: ICD-10-CM

## 2023-03-14 DIAGNOSIS — R29.898 WEAKNESS OF BOTH LOWER EXTREMITIES: ICD-10-CM

## 2023-03-14 DIAGNOSIS — R26.2 DIFFICULTY IN WALKING: Primary | ICD-10-CM

## 2023-03-14 DIAGNOSIS — M25.561 PAIN IN BOTH KNEES, UNSPECIFIED CHRONICITY: ICD-10-CM

## 2023-03-14 PROCEDURE — 97110 THERAPEUTIC EXERCISES: CPT | Performed by: PHYSICAL THERAPIST

## 2023-03-14 PROCEDURE — 97162 PT EVAL MOD COMPLEX 30 MIN: CPT | Performed by: PHYSICAL THERAPIST

## 2023-03-14 NOTE — PROGRESS NOTES
[facility] [address] [address]  Dept: [phone]     \"[facility]\"     Physical Therapy Initial Assessment     Referring MD: Sherley Riedel, APRN - *  Diagnosis:    Diagnosis Orders   1. Difficulty in walking        2. Knee swelling        3. Impaired functional mobility, balance, gait, and endurance        4. Weakness of both lower extremities        5. Pain in both knees, unspecified chronicity [M25.561, M25.562 (ICD-10-CM)]            Therapy precautions:None  Co-morbidities affecting plan of care: CAD  Total Timed Procedure Codes: 25 min, Total Time: 50 min    PERTINENT MEDICAL HISTORY     Past medical and surgical history:   Past Medical History:   Diagnosis Date    Anemia     not currently taking iron supplements; had hysterectomy    Arthritis     foot    Benign essential tremor 3/15/2018    CAD (coronary artery disease) 2021    GERD (gastroesophageal reflux disease)     no meds    History of chicken pox     Hypertension     pt denies    OAB (overactive bladder) 7/3/2014      Past Surgical History:   Procedure Laterality Date     SECTION      x3    COLONOSCOPY      DILATION AND CURETTAGE OF UTERUS      HYSTERECTOMY (CERVIX STATUS UNKNOWN)  2013    [facility]    LAP,TUBAL CAUTERY      ORTHOPEDIC SURGERY      right ankle    NE UNLISTED PROCEDURE CARDIAC SURGERY      stent x 1---2-    TUBAL LIGATION       Medications: reviewed in chart   Allergies: No Known Allergies     Diagnostic exams (per chart review):   Imaging:   Knee XR: 4 views      Clinical Indication  1. Pain in both knees, unspecified chronicity    2.  Patellofemoral disorder of both knees              Report: AP, lateral, PA flexion, sunrise views of the Bilateral knee demonstrates no acute fracture dislocation, or advanced degenerative changes     Impression: No acute findings as above          SUBJECTIVE     Chief complaints/history of injury: 62 y.o. female presents to PT for eval and treat left > right knee instability and pain. Insidious onset 1.5 to 2 months ago. Feels knee \"slipping\" posteriorly and has to bend and straighten knee to \"get back into place\". Pt states knees gives way sporadically when walking. Pt is concerned because she is afraid of falling. Date symptoms began: 6-8 weeks ago  Korina Styles of condition: Recent onset (initial onset within last 3 months)  Primary cause of current episode: Repetitive  How did symptoms start: unable to ID  Describe current symptoms: posterior knee pain and instability left > right    Received previous outpatient therapy? No    Pain Assessment:  Pain location: left knee    Average Pain/symptom intensity (0-10 scale)  Last 24 hours: 7/10  Last week (1-7 days): 7/10  How often do you feel symptoms? Frequently (51-75%)  Description: aching, burning, and sharp  Aggravating factors: prolonged standing, transferring sit-stand, and walking  Alleviating factors: rest    Neuro screen: denies numbness, tingling, and radiating pain    Social/Functional Hx: How would you rate your overall health? good  Pt lives  fiance and 8 yr old granddaughter  in a(n) 1 story house with entry steps. Current DME: none  Work Status: Employed full time: tresa at [employer]   Sleep: normal  PLOF & Social Hx/Interests: Independent and active without physical limitations and participated in work unrestricted   How much have your symptoms interfered with daily activities? Quite a bit  Current level of function: modified independent with work activities.     Patient Stated Goals: prevent falls and resolve pain    OBJECTIVE EXAMINATION     Functional Outcome Questionnaire: Lower Extremity Functional Scale: 66/80= 82.5% function   Observation:   Posture: Weight shift right  Swelling/Edema: moderate posterior left knee  Skin Integrity: normal   Palpation: tenderness and palpable thickening in posterior left knee (possible Baker's Cyst) with + concordant sign; muscle spasms left hamstrings > quads  Patella Mobility: hypomobile and guarding left    A/PROM Measures:    Right Left Comment   Knee Extension 0 -2 from 0 Pain w/prolonged EROM left knee ext   Knee Flexion 133 130    Hip [facility]    Ankle [facility] WFL            Strength/MMT (0-5 Scale):   Right Left Comment   Knee Flexion 4+ 3+ Apprehension left   Knee Extension 4+ 4    Quad set 5 4    Hip Flexion 4+ 4-    Hip Extension 4+ 4    Hip Abduction 4 3+    Hip ER 4 3+    Hip IR 4+ 4+    Ankle DF 5 4+    Ankle PF 5 4            Special Tests/Function:   Gait: decreased ryland  decreased knee extension in midstance left  Stair management:step-to leading right ascending, step-to leading left descending  Sit to stand: bilat UE assist  Balance: right = 6 sec, left = 2 sec (SLS)    Special tests:   Lachman's test: Neg  Anterior drawer: Neg  Posterior drawer: Neg  Valgus stress test: Neg  Varus stress test: Neg  Ladi's test: Questionable due to pt guarding. Pain reprod    Treatment provided today consisted of initial evaluation followed by: Therapeutic exercise (68941) x 25 min to address ROM/strength deficits and to develop an initial HEP as noted below. Access Code: [email or web address]  URL: [email or web address]  Date: 03/14/2023  Prepared by: Yoel Mendoza    Exercises  Supine Quad Set - 2 x daily - 2-3 sets - 10 reps  Active Straight Leg Raise with Quad Set - 2 x daily - 5-20 reps  Clamshell - 2 x daily - 5-20 reps  Seated Long Arc Quad - 2 x daily - 5-20 reps  Long Sitting Calf Stretch with Strap - 2 x daily - 2-3 sets - 20 sec hold  Seated Hamstring Stretch - 2 x daily - 2-3 sets - 20 sec hold    Patient Education on the condition/pathology, involved anatomy, and exercise rationale. Ice and activity modification.      CLINICAL DECISION MAKING/ASSESSMENT     Personal Factors/co-morbidities affecting POC (1-2 Medium/3+High): environmental factors  profession   Problem List: (1-2 Low/ 3 Medium/ 4+ High) Pain  Localized swelling/edema  ROM limitations  Soft tissue restrictions  Strength deficits  Muscle spasm  Impaired transfers  Impaired gait  Impaired balance/proprioception  Decreased endurance/activity Tolerance  ADL/functional limitations/modifications  Limited work/occupational capacity    Clinical decision making: moderate complexity with questionable prediction of expectations and future outcomes which may require adjustments to the POC. Prognosis: good   Benefits and precautions of treatment explained to patient. Bernice Cowart is a 62 y.o. female who presents to therapy today with evolving/changing clinical presentation (moderate complexity)  related to bilat LE dysfunction. Presentation is consistent with knee instability. Pt would benefit from skilled physical therapy services to address the deficits noted above for return to prior level of function. PLAN OF CARE     Effective Dates: 3/14/2023 TO 6/12/2023 (90 days).     Frequency/Duration: 2x/week for 90 Day(s)  Interventions may include but are not limited to: (10331) Therapeutic exercise to develop ROM, strength, endurance and flexibility  (97706) Therapeutic activities using dynamic activities to improve function  (77392) Gait training to address mechanics, proper step length and weight shifting to improve household and community mobility as well as overall safety with ADLs  (56170) Manual therapy techniques to improve joint and/or soft tissue mobility, ROM, and function as well as helping to decrease pain/spasms and swelling  (30078) Neuromuscular reeducation addressing impaired balance, coordination, kinesthetic sense, posture and proprioception  (49035/34858) Dry needling for the management of neuromusculoskeletal pain and movement impairment  Home exercise program (HEP) development  Modalities prn to address pain, spasms, and swelling: (22882/) Electrical stimulation- unattended  (46635) Ultrasound/phonophoresis  (24985) Vasopneumatic compression        GOALS     Short term goals to be met by 4/11/2023  (4 weeks):  Pt will demonstrate good recall of HEP requiring minimal verbal cuing for proper form and technique. Pt will be able to modify activities in order keep pain to minimal levels (= 5/10) with ADLs. Patient will achieve 0° active knee extension to normalize gait pattern. Pt will increase strength of bilat LE to >/= 4/5 for improved tolerance to walking. Long term goals to be met by 5/9/2023  (8 weeks):   Pt will independently ascend and descend steps with reciprocal pattern demonstrating good control and balance using rails only for balance. Pt will demonstrate single leg stance 5 sec or greater each LE for improved tolerance to uneven surfaces and increased single leg activities. 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 Pt will report pain decreased to intermittent with current activity level indicating improved joint function. Long term goals to be met by 6/6/2023  (12 weeks):   Pt will return to full work duty with min/no modifications for knee pain or instability. Pt will report return to previous level of function without c/o pain. Improve LEFS to < 15% functional restrictions with ADLs, work and athletic activities. Pt will report no episodes of knee giving way for 3 weeks or greater with participation in full PLOF. Discharged from [place] [address]    Access Code: [email or web address]  URL: [email or web address]  Date: 03/14/2023  Prepared by: Margie Conway    Exercises  Supine Quad Set - 2 x daily - 2-3 sets - 10 reps  Active Straight Leg Raise with Quad Set - 2 x daily - 5-20 reps  Clamshell - 2 x daily - 5-20 reps  Seated Long Arc Quad - 2 x daily - 5-20 reps  Long Sitting Calf Stretch with Strap - 2 x daily - 2-3 sets - 20 sec hold  Seated Hamstring Stretch - 2 x daily - 2-3 sets - 20 sec hold

## 2023-03-17 ENCOUNTER — OFFICE VISIT (OUTPATIENT)
Dept: ENT CLINIC | Age: 58
End: 2023-03-17
Payer: COMMERCIAL

## 2023-03-17 VITALS — WEIGHT: 134 LBS | BODY MASS INDEX: 27.01 KG/M2 | RESPIRATION RATE: 18 BRPM | HEIGHT: 59 IN

## 2023-03-17 DIAGNOSIS — K21.9 LPRD (LARYNGOPHARYNGEAL REFLUX DISEASE): Primary | ICD-10-CM

## 2023-03-17 PROCEDURE — 99213 OFFICE O/P EST LOW 20 MIN: CPT | Performed by: OTOLARYNGOLOGY

## 2023-03-17 ASSESSMENT — ENCOUNTER SYMPTOMS
ABDOMINAL PAIN: 0
SHORTNESS OF BREATH: 0

## 2023-03-17 NOTE — PROGRESS NOTES
Chief Complaint   Patient presents with    Follow-up     Patient states that she is doing well with the medication that was given.        HPI:  Arlette Perez is a 58 y.o. female seen in follow-up for her throat.  She has history of LPRD and was last seen back in December.  She has been taking Nexium 40 mg daily and I also added Pepcid 20 mg nightly at her last visit.  Today, she is doing fairly well w/ no new throat sxs. She is tolerating the medication well but still reports some heartburn which occurs about twice a month.  The heartburn usually occurs in the mornings when she is at work.  There has not been any chronic sore throat, dysphagia, odynophagia, or change in her voice.  There is no further burning sensation in her mouth.    Past Medical History, Past Surgical History, Family history, Social History, and Medications were all reviewed with the patient today and updated as necessary.     No Known Allergies  Patient Active Problem List   Diagnosis    Coronary artery disease involving native coronary artery of native heart without angina pectoris    Essential hypertension    Mixed hyperlipidemia    Tobacco use    Atherosclerosis of abdominal aorta (HCC)    Panlobular emphysema (HCC)     Current Outpatient Medications   Medication Sig    metoprolol succinate (TOPROL XL) 25 MG extended release tablet TAKE ONE TABLET BY MOUTH ONE TIME DAILY    buPROPion (WELLBUTRIN XL) 150 MG extended release tablet Take 1 tablet by mouth every morning    famotidine (PEPCID) 20 MG tablet Take 1 tablet by mouth nightly    esomeprazole (NEXIUM) 40 MG delayed release capsule Take 1 capsule by mouth every morning (before breakfast)    busPIRone (BUSPAR) 10 MG tablet tid    umeclidinium-vilanterol (ANORO ELLIPTA) 62.5-25 MCG/INH AEPB inhaler Inhale 1 puff into the lungs in the morning.    aspirin 81 MG EC tablet Take 81 mg by mouth daily    isosorbide mononitrate (IMDUR) 60 MG extended release tablet Take 60 mg by mouth daily     montelukast (SINGULAIR) 10 MG tablet Take 10 mg by mouth daily    nitroGLYCERIN (NITROSTAT) 0.4 MG SL tablet Place 0.4 mg under the tongue    ranolazine (RANEXA) 500 MG extended release tablet Take 500 mg by mouth 2 times daily    rosuvastatin (CRESTOR) 20 MG tablet Take 20 mg by mouth    ticagrelor (BRILINTA) 90 MG TABS tablet Take 90 mg by mouth 2 times daily     No current facility-administered medications for this visit. Past Medical History:   Diagnosis Date    Anemia     not currently taking iron supplements; had hysterectomy    Arthritis     foot    Benign essential tremor 3/15/2018    CAD (coronary artery disease) 2021    GERD (gastroesophageal reflux disease)     no meds    History of chicken pox     Hypertension     pt denies    OAB (overactive bladder) 7/3/2014     Social History     Tobacco Use    Smoking status: Every Day     Packs/day: 0.50     Years: 40.00     Pack years: 20.00     Types: Cigarettes     Start date: 1982    Smokeless tobacco: Never    Tobacco comments:     Quit smokin years   Substance Use Topics    Alcohol use: Yes     Alcohol/week: 1.0 standard drink     Past Surgical History:   Procedure Laterality Date     SECTION      x3    COLONOSCOPY      DILATION AND CURETTAGE OF UTERUS      HYSTERECTOMY (CERVIX STATUS UNKNOWN)  2013    TLH    LAP,TUBAL CAUTERY      ORTHOPEDIC SURGERY      right ankle    OK UNLISTED PROCEDURE CARDIAC SURGERY      stent x 1---    TUBAL LIGATION       Family History   Problem Relation Age of Onset    Ovarian Cancer Neg Hx     Breast Cancer Neg Hx     Cancer Neg Hx     Other Neg Hx     Post-op Cognitive Dysfunction Neg Hx     Emergence Delirium Neg Hx     Post-op Nausea/Vomiting Neg Hx     Delayed Awakening Neg Hx     Pseudochol.  Deficiency Neg Hx     Malig Hypertherm Neg Hx     No Known Problems Mother     Heart Disease Father     Heart Attack Father 68    Colon Cancer Father 76    Hypertension Father     Diabetes Father ROS:    Review of Systems   Constitutional:  Negative for fever. Eyes:  Negative for visual disturbance. Respiratory:  Negative for shortness of breath. Cardiovascular:  Negative for chest pain. Gastrointestinal:  Negative for abdominal pain. Skin:  Negative for rash. Neurological:  Negative for dizziness and headaches. Hematological:  Negative for adenopathy. Psychiatric/Behavioral:  Negative for agitation. PHYSICAL EXAM:    Resp 18   Ht 4' 11\" (1.499 m)   Wt 134 lb (60.8 kg)   BMI 27.06 kg/m²     General: NAD, well-appearing  Neuro: No gross neuro deficits. No facial weakness. Eyes: No periorbital edema/ecchymosis. No nystagmus. Skin: No facial erythema, rashes or concerning lesions. Nose: No external deviations or saddling. Intranasally, septum is midline without perforations, nasal mucosa appears healthy with no erythema, mucopurulence, or polyps. Mouth: Moist mucus membranes, normal tongue/palate mobility, no concerning mucosal lesions. Oropharynx clear with no erythema/exudate, no tonsillar hypertrophy. Ears: Normal appearing auricles, no hematomas. EACs clear with no cerumen impaction, healthy canal skin, TM's intact with no perforations or retraction pockets. No middle ear effusions. Neck: Soft, supple, no palpable neck masses. No palpable parotid or submandibular masses. No thyromegaly or palpable thyroid nodules. Lymphatics: No palpable cervical LAD. Resp: No audible stridor or wheezing. CV: No murmurs, no JVD. Extremities: No clubbing or cyanosis. ASSESSMENT and PLAN      ICD-10-CM    1. LPRD (laryngopharyngeal reflux disease)  K21.9         Her throat exam was clear today and she is tolerating her current reflux regimen of Nexium 40 mg daily and Pepcid 20 mg nightly.   I would have her continue this regimen for now and I will have her see our [facility] [place] for a medication refill appointment in 6 months    Yaniv Leroy MD  3/17/2023    Electronically signed by Jessica Boston MD on 3/17/2023 at 11:09 AM

## 2023-03-29 ENCOUNTER — TELEPHONE (OUTPATIENT)
Age: 58
End: 2023-03-29

## 2023-03-29 NOTE — TELEPHONE ENCOUNTER
Pt did not show for their scheduled therapy appointment today. Reason: work conflict  Communication: Called patient due to no show. Pt is still at work. Offered additional times today and pt unable to attend.

## 2023-04-03 ENCOUNTER — OFFICE VISIT (OUTPATIENT)
Age: 58
End: 2023-04-03
Payer: COMMERCIAL

## 2023-04-03 ENCOUNTER — OFFICE VISIT (OUTPATIENT)
Dept: OBGYN CLINIC | Age: 58
End: 2023-04-03
Payer: COMMERCIAL

## 2023-04-03 VITALS — WEIGHT: 137 LBS | HEIGHT: 59 IN | BODY MASS INDEX: 27.62 KG/M2

## 2023-04-03 DIAGNOSIS — M22.2X2 PATELLOFEMORAL DISORDER OF BOTH KNEES: ICD-10-CM

## 2023-04-03 DIAGNOSIS — Z74.09 IMPAIRED FUNCTIONAL MOBILITY, BALANCE, GAIT, AND ENDURANCE: ICD-10-CM

## 2023-04-03 DIAGNOSIS — M25.562 PAIN IN BOTH KNEES, UNSPECIFIED CHRONICITY: ICD-10-CM

## 2023-04-03 DIAGNOSIS — Z12.31 ENCOUNTER FOR SCREENING MAMMOGRAM FOR MALIGNANT NEOPLASM OF BREAST: ICD-10-CM

## 2023-04-03 DIAGNOSIS — R26.2 DIFFICULTY IN WALKING: Primary | ICD-10-CM

## 2023-04-03 DIAGNOSIS — M25.561 PAIN IN BOTH KNEES, UNSPECIFIED CHRONICITY: ICD-10-CM

## 2023-04-03 DIAGNOSIS — Z01.419 WELL WOMAN EXAM WITH ROUTINE GYNECOLOGICAL EXAM: Primary | ICD-10-CM

## 2023-04-03 DIAGNOSIS — M22.2X1 PATELLOFEMORAL DISORDER OF BOTH KNEES: ICD-10-CM

## 2023-04-03 DIAGNOSIS — M25.469 KNEE SWELLING: ICD-10-CM

## 2023-04-03 DIAGNOSIS — R29.898 WEAKNESS OF BOTH LOWER EXTREMITIES: ICD-10-CM

## 2023-04-03 PROCEDURE — 99396 PREV VISIT EST AGE 40-64: CPT | Performed by: OBSTETRICS & GYNECOLOGY

## 2023-04-03 PROCEDURE — 97110 THERAPEUTIC EXERCISES: CPT | Performed by: PHYSICAL THERAPIST

## 2023-04-03 ASSESSMENT — ENCOUNTER SYMPTOMS
COUGH: 0
GASTROINTESTINAL NEGATIVE: 1
ABDOMINAL PAIN: 0
EYES NEGATIVE: 1
SHORTNESS OF BREATH: 1
BLOOD IN STOOL: 0
ALLERGIC/IMMUNOLOGIC NEGATIVE: 1

## 2023-04-03 NOTE — PROGRESS NOTES
Bernice Cowart is 62 y.o. female, No obstetric history on file., who presents today for a routine annual gynecological examination. No LMP recorded. Patient has had a hysterectomy. . Doing well. No Gyn, Breast, G/U, or GI complaints. Mammogram/Pap Hx 4/3/2023   Mammogram Date 2022   Mammogram Result negative   Pap Date 3/11/2021   Pap Result negative     GYN Intake Questionnaire 4/3/2023   Current Form of Contraception Hysterectomy   Date of Mammogram 2022   Result of Mammogram negative   Date of Pap 3/11/2021   Pap result negative       Contraception: hyst  Has received Gardisil: NO  Last [place] 5 years ago (still on blood thinners)  Last time cholesterol was checked: 3 months ago    OB History:   OB History    Para Term  AB Living   3   3         SAB IAB Ectopic Molar Multiple Live Births                       Health Maintenance Due   Topic Date Due    Low dose CT lung screening  2022         GYN History            Marilu Reardon  has a past medical history of Anemia, Arthritis, Benign essential tremor, CAD (coronary artery disease), GERD (gastroesophageal reflux disease), History of chicken pox, Hypertension, and OAB (overactive bladder). .    Her surgeries include  has a past surgical history that includes lap,tubal cautery; pr unlisted procedure cardiac surgery; Tubal ligation; Dilation and curettage of uterus; Laparoscopic hysterectomy (2013);  section; orthopedic surgery; and Colonoscopy. .    No Known Allergies     Her current meds are:   Current Outpatient Medications   Medication Sig    metoprolol succinate (TOPROL XL) 25 MG extended release tablet TAKE ONE TABLET BY MOUTH ONE TIME DAILY    buPROPion (WELLBUTRIN XL) 150 MG extended release tablet Take 1 tablet by mouth every morning    esomeprazole (NEXIUM) 40 MG delayed release capsule Take 1 capsule by mouth every morning (before breakfast)    busPIRone (BUSPAR) 10 MG tablet tid    umeclidinium-vilanterol (ANORO

## 2023-04-03 NOTE — PROGRESS NOTES
[facility] PT INT [facility]  [address] [place] [id number]  Dept: [phone]     \"Lali\"     Physical Therapy Daily Note     Referring MD: YISSEL Paulino - *  Diagnosis:    Diagnosis Orders   1. Difficulty in walking        2. Knee swelling        3. Impaired functional mobility, balance, gait, and endurance        4. Weakness of both lower extremities        5. Pain in both knees, unspecified chronicity [M25.561, M25.562 (ICD-10-CM)]        6. Patellofemoral disorder of both knees            Therapy precautions:None  Co-morbidities affecting plan of care: CAD  Chief complaints/history of injury: 62 y.o. female presents to PT for eval and treat left > right knee instability and pain. Insidious onset 1.5 to 2 months ago. Feels knee \"slipping\" posteriorly and has to bend and straighten knee to \"get back into place\". Pt states knees gives way sporadically when walking. Pt is concerned because she is afraid of falling. Patient Stated Goals: prevent falls and resolve pain    Total Timed Procedure Codes: 40 min, Total Time: 45 min Modifier needed: No    SUBJECTIVE     Pt reports no change with B knee pain. She  has not had a chance to complete her home program.     OBJECTIVE     Findings: Intermittent genu recurvatum B    Treatment provided today:  Therapeutic exercise (36651) x 40 min to develop ROM, strength, endurance and flexibility of the BLEs. Nustep level 4 x 5 min  Seated hamstring stretch H30sec x 2 B  Supine quad set H5sec x 30  Supine SLR w/ quad set 2x10  Sidelying clamshell 2x10 B  Standing heel raises 2x10 B  Partial wall squat H10sec x 5- increased pain  LAQ x 10 B  Sidelying hip abduction 2x10 B    ASSESSMENT     Pt with increased pain noted L > R knee after partial wall squats. Will hold next session. Pt with genu recurvatum in standing and intermittent knee hyperextension- will continue to focus on quadriceps strength and quad-ham con-contraction to address.

## 2023-04-06 ENCOUNTER — OFFICE VISIT (OUTPATIENT)
Age: 58
End: 2023-04-06

## 2023-04-06 DIAGNOSIS — Z74.09 IMPAIRED FUNCTIONAL MOBILITY, BALANCE, GAIT, AND ENDURANCE: ICD-10-CM

## 2023-04-06 DIAGNOSIS — M25.469 KNEE SWELLING: ICD-10-CM

## 2023-04-06 DIAGNOSIS — M22.2X1 PATELLOFEMORAL DISORDER OF BOTH KNEES: ICD-10-CM

## 2023-04-06 DIAGNOSIS — M22.2X2 PATELLOFEMORAL DISORDER OF BOTH KNEES: ICD-10-CM

## 2023-04-06 DIAGNOSIS — R26.2 DIFFICULTY IN WALKING: Primary | ICD-10-CM

## 2023-04-06 DIAGNOSIS — R29.898 WEAKNESS OF BOTH LOWER EXTREMITIES: ICD-10-CM

## 2023-04-06 DIAGNOSIS — M25.561 PAIN IN BOTH KNEES, UNSPECIFIED CHRONICITY: ICD-10-CM

## 2023-04-06 DIAGNOSIS — M25.562 PAIN IN BOTH KNEES, UNSPECIFIED CHRONICITY: ICD-10-CM

## 2023-04-06 NOTE — PROGRESS NOTES
[facility] PT INT [facility]on[facility] Quivers  [address] [place] [id number]  Dept: [phone]     \"Lali\"     Physical Therapy Daily Note     Referring MD: YISSEL Love - *  Diagnosis:    Diagnosis Orders   1. Difficulty in walking        2. Knee swelling        3. Impaired functional mobility, balance, gait, and endurance        4. Weakness of both lower extremities        5. Pain in both knees, unspecified chronicity [M25.561, M25.562 (ICD-10-CM)]        6. Patellofemoral disorder of both knees            Therapy precautions:None  Co-morbidities affecting plan of care: CAD  Chief complaints/history of injury: 62 y.o. female presents to PT for eval and treat left > right knee instability and pain. Insidious onset 1.5 to 2 months ago. Feels knee \"slipping\" posteriorly and has to bend and straighten knee to \"get back into place\". Pt states knees gives way sporadically when walking. Pt is concerned because she is afraid of falling. Patient Stated Goals: prevent falls and resolve pain    Total Timed Procedure Codes: 40 min, Total Time: 45 min Modifier needed: No    SUBJECTIVE     Pt continues to report no changes in knee pain. Improved consistency with HEP. OBJECTIVE     Findings: Intermittent genu recurvatum B    Treatment provided today:  Therapeutic exercise (55184) x 40 min to develop ROM, strength, endurance and flexibility of the BLEs. Nustep level 4 x 5 min  Seated hamstring stretch H30sec x 2 B  Supine quad set H5sec x 30  Supine SLR w/ quad set 2x10  Sidelying clamshell 2x10 B  Standing heel raises 2x10 B  Partial wall squat H10sec x 5- increased pain  LAQ x 10 B  Sidelying hip abduction 2x10 B  Standing weight shifts 2 x 10  Prone hip extension 2 x10    ASSESSMENT     Pt continues to report no change in pain but able to progress exercises without increased pain. Easy fatigue with all SLR exercises.  Able to recover with rest. Intermittent cuing to avoid compensation with

## 2023-04-19 ENCOUNTER — TELEPHONE (OUTPATIENT)
Age: 58
End: 2023-04-19

## 2023-04-19 NOTE — TELEPHONE ENCOUNTER
Pt did not show for their scheduled therapy appointment 4/18/23. Reason: illness  Communication: Called pt and she reported that she cancelled via her [employer] portal on 4/18/23. She has a sinus infection and requested cancellation of next appt 4/20/23.  Confirmed appt for 4/25/23 at 1 pm.

## 2023-04-25 ENCOUNTER — TELEPHONE (OUTPATIENT)
Age: 58
End: 2023-04-25

## 2023-04-25 NOTE — TELEPHONE ENCOUNTER
Pt did not show for their scheduled therapy appointment today. Reason: work conflict  Communication: Called patient and she is still at work. Pt states that she will be at her scheduled appt 4/27/23. She states that Tuesdays are busy at work and she will not schedule on Tuesdays going forward. Will discuss schedule and potential decrease in frequency to 1x/week at next appt.

## 2023-04-27 ENCOUNTER — TREATMENT (OUTPATIENT)
Age: 58
End: 2023-04-27
Payer: COMMERCIAL

## 2023-04-27 DIAGNOSIS — M22.2X1 PATELLOFEMORAL DISORDER OF BOTH KNEES: ICD-10-CM

## 2023-04-27 DIAGNOSIS — M25.469 KNEE SWELLING: ICD-10-CM

## 2023-04-27 DIAGNOSIS — R29.898 WEAKNESS OF BOTH LOWER EXTREMITIES: ICD-10-CM

## 2023-04-27 DIAGNOSIS — M25.561 PAIN IN BOTH KNEES, UNSPECIFIED CHRONICITY: ICD-10-CM

## 2023-04-27 DIAGNOSIS — M25.562 PAIN IN BOTH KNEES, UNSPECIFIED CHRONICITY: ICD-10-CM

## 2023-04-27 DIAGNOSIS — M22.2X2 PATELLOFEMORAL DISORDER OF BOTH KNEES: ICD-10-CM

## 2023-04-27 DIAGNOSIS — Z74.09 IMPAIRED FUNCTIONAL MOBILITY, BALANCE, GAIT, AND ENDURANCE: ICD-10-CM

## 2023-04-27 DIAGNOSIS — R26.2 DIFFICULTY IN WALKING: Primary | ICD-10-CM

## 2023-04-27 PROCEDURE — 97110 THERAPEUTIC EXERCISES: CPT | Performed by: PHYSICAL THERAPIST

## 2023-04-27 NOTE — PROGRESS NOTES
[facility] PT INT Shel[facility] Suá[facility]  [address] [place] [id number]  Dept: [phone]     \"Lali\"     Physical Therapy Daily Note     Referring MD: YISSEL Acosta - *  Diagnosis:    Diagnosis Orders   1. Difficulty in walking        2. Knee swelling        3. Impaired functional mobility, balance, gait, and endurance        4. Weakness of both lower extremities        5. Pain in both knees, unspecified chronicity [M25.561, M25.562 (ICD-10-CM)]        6. Patellofemoral disorder of both knees            Therapy precautions:None  Co-morbidities affecting plan of care: CAD  Chief complaints/history of injury: 62 y.o. female presents to PT for eval and treat left > right knee instability and pain. Insidious onset 1.5 to 2 months ago. Feels knee \"slipping\" posteriorly and has to bend and straighten knee to \"get back into place\". Pt states knees gives way sporadically when walking. Pt is concerned because she is afraid of falling. Patient Stated Goals: prevent falls and resolve pain    Total Timed Procedure Codes: 40 min, Total Time: 45 min Modifier needed: No    SUBJECTIVE     Pt unable to attend PT 2 x week due to work requirements. Has been out of work and PT x 1 week due to sinus infection. Still not feeling 100%.  Unable to perform exercises lying supine due to       OBJECTIVE     Findings: Intermittent genu recurvatum B    A/PROM Measures: 04/13/2023    Right Left Comment   Knee Extension 0 0 Pain w/prolonged EROM left knee ext   Knee Flexion 133 130     Hip [facility]     Ankle [facility] WFL                  Strength/MMT (0-5 Scale):    Right Left Comment   Knee Flexion 4+ 4     Knee Extension 4+ 4     Quad set 5 5     Hip Flexion 4+ 4     Hip Extension 4+ 4     Hip Abduction 4 3+     Hip ER 4 3+     Hip IR 4+ 4+     Ankle DF 5 4+     Ankle PF 5 4+                  Treatment provided today:  Therapeutic exercise (16890) x 40 min to develop ROM, strength, endurance and flexibility of the

## 2023-05-04 ENCOUNTER — TREATMENT (OUTPATIENT)
Age: 58
End: 2023-05-04

## 2023-05-04 DIAGNOSIS — Z74.09 IMPAIRED FUNCTIONAL MOBILITY, BALANCE, GAIT, AND ENDURANCE: ICD-10-CM

## 2023-05-04 DIAGNOSIS — R26.2 DIFFICULTY IN WALKING: Primary | ICD-10-CM

## 2023-05-04 DIAGNOSIS — M25.469 KNEE SWELLING: ICD-10-CM

## 2023-05-04 DIAGNOSIS — R29.898 WEAKNESS OF BOTH LOWER EXTREMITIES: ICD-10-CM

## 2023-05-04 DIAGNOSIS — M25.561 PAIN IN BOTH KNEES, UNSPECIFIED CHRONICITY: ICD-10-CM

## 2023-05-04 DIAGNOSIS — M25.562 PAIN IN BOTH KNEES, UNSPECIFIED CHRONICITY: ICD-10-CM

## 2023-05-04 NOTE — PROGRESS NOTES
[facility] PT INT Delia Musa  [address] [place] [id number]  Dept: [phone]     \"Lali\"     Physical Therapy Daily Note     Referring MD: YISSEL Cramer - *  Diagnosis:    Diagnosis Orders   1. Difficulty in walking        2. Knee swelling        3. Impaired functional mobility, balance, gait, and endurance        4. Weakness of both lower extremities        5. Pain in both knees, unspecified chronicity [M25.561, M25.562 (ICD-10-CM)]            Therapy precautions:None  Co-morbidities affecting plan of care: CAD  Chief complaints/history of injury: 62 y.o. female presents to PT for eval and treat left > right knee instability and pain. Insidious onset 1.5 to 2 months ago. Feels knee \"slipping\" posteriorly and has to bend and straighten knee to \"get back into place\". Pt states knees gives way sporadically when walking. Pt is concerned because she is afraid of falling. Patient Stated Goals: prevent falls and resolve pain    Total Timed Procedure Codes: 40 min, Total Time: 45 min Modifier needed: No    SUBJECTIVE     Pt reports that sinus infection is improved and energy level is up. Knee pain persists with sensation of knee caps slipping out to side (lateral). At end of session, pt reported burning pain B thighs with extended walking. OBJECTIVE     Treatment provided today:  Therapeutic exercise (22637) x 40 min to develop ROM, strength, endurance and flexibility of the BLEs. Nustep level 5 x 5 min  Kinesiotape applied to B knees for patella stability with 4 3-block I-strips (2 each knee)  TRX squat x 10  Supine quad set + SLR w/ ER 3x10 B  Bilateral leg press w/ ball squeeze 70# 3x8  Bridge over red ball 2x10  Supine HSC over red ball 2x15  Prone over table hip extension x 10 w/ straight knee, x 10 w/ flexed knee B  B  Updated HEP  ASSESSMENT     Pt tolerated standing exercise well without increased pain. She reported improved knee stability with kinesiotape in place.   Pt

## 2023-05-11 ENCOUNTER — TREATMENT (OUTPATIENT)
Age: 58
End: 2023-05-11
Payer: COMMERCIAL

## 2023-05-11 DIAGNOSIS — M22.2X1 PATELLOFEMORAL DISORDER OF BOTH KNEES: ICD-10-CM

## 2023-05-11 DIAGNOSIS — R26.2 DIFFICULTY IN WALKING: Primary | ICD-10-CM

## 2023-05-11 DIAGNOSIS — M25.469 KNEE SWELLING: ICD-10-CM

## 2023-05-11 DIAGNOSIS — M22.2X2 PATELLOFEMORAL DISORDER OF BOTH KNEES: ICD-10-CM

## 2023-05-11 DIAGNOSIS — R29.898 WEAKNESS OF BOTH LOWER EXTREMITIES: ICD-10-CM

## 2023-05-11 DIAGNOSIS — Z74.09 IMPAIRED FUNCTIONAL MOBILITY, BALANCE, GAIT, AND ENDURANCE: ICD-10-CM

## 2023-05-11 DIAGNOSIS — M25.562 PAIN IN BOTH KNEES, UNSPECIFIED CHRONICITY: ICD-10-CM

## 2023-05-11 DIAGNOSIS — M25.561 PAIN IN BOTH KNEES, UNSPECIFIED CHRONICITY: ICD-10-CM

## 2023-05-11 PROCEDURE — 97110 THERAPEUTIC EXERCISES: CPT | Performed by: PHYSICAL THERAPIST

## 2023-05-11 NOTE — PROGRESS NOTES
term goals to be met by 6/6/2023  (12 weeks):   Pt will return to full work duty with min/no modifications for knee pain or instability. Pt will report return to previous level of function without c/o pain. Improve LEFS to < 15% functional restrictions with ADLs, work and athletic activities. Pt will report no episodes of knee giving way for 3 weeks or greater with participation in full PLOF. Discharged from [place] [id number]  Access Code: [email or web address]  URL: [email or web address]  Date: 05/11/2023  Prepared by: Carloz Becerra    Exercises  - Supine Quad Set  - 2 x daily - 2-3 sets - 10 reps  - Clamshell  - 5 x weekly - 2-3 sets - 10 reps  - Sidelying Hip Abduction  - 5 x weekly - 2-3 sets - 10 reps - 3 sec hold  - Straight Leg Raise with External Rotation  - 5 x weekly - 3 sets - 10 reps - 5 hold  - Supine Bridge with Mini Swiss Ball Between Knees  - 5 x weekly - 2-3 sets - 10-15 reps - 5 hold  - Long Sitting Calf Stretch with Strap  - 1 x daily - 2-3 sets - 20 sec hold  - Seated Hamstring Stretch  - 1 x daily - 2-3 sets - 20 sec hold  - Hip Extension with Single Leg Support Prone on Table Edge  - 5 x weekly - 1 sets - 10 reps - 3 hold  - Prone Hip Extension on Table  - 5 x weekly - 1 sets - 10 reps - 3 hold  - Forward Step Up  - 4-5 x weekly - 2 sets - 10 reps

## 2023-05-18 ENCOUNTER — TREATMENT (OUTPATIENT)
Age: 58
End: 2023-05-18

## 2023-05-18 DIAGNOSIS — Z74.09 IMPAIRED FUNCTIONAL MOBILITY, BALANCE, GAIT, AND ENDURANCE: ICD-10-CM

## 2023-05-18 DIAGNOSIS — M25.562 PAIN IN BOTH KNEES, UNSPECIFIED CHRONICITY: ICD-10-CM

## 2023-05-18 DIAGNOSIS — M22.2X1 PATELLOFEMORAL DISORDER OF BOTH KNEES: ICD-10-CM

## 2023-05-18 DIAGNOSIS — M25.469 KNEE SWELLING: ICD-10-CM

## 2023-05-18 DIAGNOSIS — M25.561 PAIN IN BOTH KNEES, UNSPECIFIED CHRONICITY: ICD-10-CM

## 2023-05-18 DIAGNOSIS — R26.2 DIFFICULTY IN WALKING: Primary | ICD-10-CM

## 2023-05-18 DIAGNOSIS — M22.2X2 PATELLOFEMORAL DISORDER OF BOTH KNEES: ICD-10-CM

## 2023-05-18 DIAGNOSIS — R29.898 WEAKNESS OF BOTH LOWER EXTREMITIES: ICD-10-CM

## 2023-05-18 NOTE — PROGRESS NOTES
[facility] PT INT [facility]  [address] [place] [phone]  Dept: [phone]     \"Lali\"     Physical Therapy Daily Note     Referring MD: YISSEL Paulino - *  Diagnosis:    Diagnosis Orders   1. Difficulty in walking        2. Knee swelling        3. Impaired functional mobility, balance, gait, and endurance        4. Weakness of both lower extremities        5. Pain in both knees, unspecified chronicity [M25.561, M25.562 (ICD-10-CM)]        6. Patellofemoral disorder of both knees              Therapy precautions:None  Co-morbidities affecting plan of care: CAD  Chief complaints/history of injury: 62 y.o. female presents to PT for eval and treat left > right knee instability and pain. Insidious onset 1.5 to 2 months ago. Feels knee \"slipping\" posteriorly and has to bend and straighten knee to \"get back into place\". Pt states knees gives way sporadically when walking. Pt is concerned because she is afraid of falling. Patient Stated Goals: prevent falls and resolve pain    Total Timed Procedure Codes: 45 min, Total Time: 45 min Modifier needed: No  Episode visit count:  8      SUBJECTIVE     Pt reports no significant changes. OBJECTIVE     Treatment provided today:  Therapeutic exercise (24071) x 45 min to develop ROM, strength, endurance and flexibility of the BLEs. Nustep level 5 x 5 min  Kinesiotape applied to B knees for patella stability with 4 3-block I-strips (2 each knee)  TRX squat 2x10  6-inch lateral step downs 2x10 B  Bilateral leg press w/ ball squeeze 70# 3x10  Side step against yellow loop band 10 feet x 2 B  Supine quad set + SLR w/ ER 3x10 B  Bridge over red ball 2x10  Isometric HSC on red ball 2x15  Prone over table hip extension x 10 w/ straight knee, x 10 w/ flexed knee B  Hip circumduction on disc using glider, stance knee at ~30 degrees flexion x 20 B  B    ASSESSMENT     Pt continues to improve with good control noted during lateral step downs.  No

## 2023-05-25 ENCOUNTER — TREATMENT (OUTPATIENT)
Age: 58
End: 2023-05-25

## 2023-05-25 DIAGNOSIS — M25.469 KNEE SWELLING: ICD-10-CM

## 2023-05-25 DIAGNOSIS — M22.2X1 PATELLOFEMORAL DISORDER OF BOTH KNEES: ICD-10-CM

## 2023-05-25 DIAGNOSIS — M22.2X2 PATELLOFEMORAL DISORDER OF BOTH KNEES: ICD-10-CM

## 2023-05-25 DIAGNOSIS — R29.898 WEAKNESS OF BOTH LOWER EXTREMITIES: ICD-10-CM

## 2023-05-25 DIAGNOSIS — Z74.09 IMPAIRED FUNCTIONAL MOBILITY, BALANCE, GAIT, AND ENDURANCE: ICD-10-CM

## 2023-05-25 DIAGNOSIS — M25.562 PAIN IN BOTH KNEES, UNSPECIFIED CHRONICITY: ICD-10-CM

## 2023-05-25 DIAGNOSIS — R26.2 DIFFICULTY IN WALKING: Primary | ICD-10-CM

## 2023-05-25 DIAGNOSIS — M25.561 PAIN IN BOTH KNEES, UNSPECIFIED CHRONICITY: ICD-10-CM

## 2023-05-25 NOTE — PROGRESS NOTES
flexion x 20 B  Supine hamstring stretch w/ strap H30sec x2 B  B    ASSESSMENT     Pt with pop L knee upon standing from mat table exercises. Pop located inferior to patella, does not appear to be subluxation. Pain during pop was described as irritating and resolved immediately after. PLAN     reassess    GOALS     Short term goals to be met by 4/11/2023  (4 weeks):  Pt will demonstrate good recall of HEP requiring minimal verbal cuing for proper form and technique. - MET 4/27/23  Pt will be able to modify activities in order keep pain to minimal levels (= 5/10) with ADLs. - MET 4/27/23  Patient will achieve 0° active knee extension to normalize gait pattern. - MET 4/27/23  Pt will increase strength of bilat LE to >/= 4/5 for improved tolerance to walking. - Partially Met 4/27/23    Short term goals to be met by 5/9/2023  (8 weeks):   Pt will independently ascend and descend steps with reciprocal pattern demonstrating good control and balance using rails only for balance. Goal Met 5/11/2023   Pt will demonstrate single leg stance 5 sec or greater each LE for improved tolerance to uneven surfaces and increased single leg activities. 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 Goal Met 5/11/2023   Pt will report pain decreased to intermittent with current activity level indicating improved joint function. Goal Met 5/11/2023     Long term goals to be met by 6/6/2023  (12 weeks):   Pt will return to full work duty with min/no modifications for knee pain or instability. Pt will report return to previous level of function without c/o pain. Improve LEFS to < 15% functional restrictions with ADLs, work and athletic activities. Pt will report no episodes of knee giving way for 3 weeks or greater with participation in full OF.    Discharged from [place] [address]  Access Code: [email or web address]  URL:

## 2023-06-08 ENCOUNTER — TELEPHONE (OUTPATIENT)
Age: 58
End: 2023-06-08

## 2023-06-08 NOTE — TELEPHONE ENCOUNTER
Pt stopped by the office today and requested that I call her to follow up. Pt notes that she had cancelled her last appt 6/1/23. Scheduled appt for 6/22/23 at 7:45 am for follow up, HEP progression, and likely discharge.

## 2023-06-22 ENCOUNTER — OFFICE VISIT (OUTPATIENT)
Dept: INTERNAL MEDICINE CLINIC | Facility: CLINIC | Age: 58
End: 2023-06-22
Payer: COMMERCIAL

## 2023-06-22 ENCOUNTER — TREATMENT (OUTPATIENT)
Age: 58
End: 2023-06-22

## 2023-06-22 VITALS
BODY MASS INDEX: 27.01 KG/M2 | HEART RATE: 69 BPM | SYSTOLIC BLOOD PRESSURE: 138 MMHG | TEMPERATURE: 97.4 F | OXYGEN SATURATION: 96 % | DIASTOLIC BLOOD PRESSURE: 79 MMHG | HEIGHT: 59 IN | WEIGHT: 134 LBS

## 2023-06-22 DIAGNOSIS — M22.2X1 PATELLOFEMORAL DISORDER OF BOTH KNEES: ICD-10-CM

## 2023-06-22 DIAGNOSIS — M25.562 PAIN IN BOTH KNEES, UNSPECIFIED CHRONICITY: ICD-10-CM

## 2023-06-22 DIAGNOSIS — M22.2X2 PATELLOFEMORAL DISORDER OF BOTH KNEES: ICD-10-CM

## 2023-06-22 DIAGNOSIS — R29.898 WEAKNESS OF BOTH LOWER EXTREMITIES: ICD-10-CM

## 2023-06-22 DIAGNOSIS — R21 RASH: Primary | ICD-10-CM

## 2023-06-22 DIAGNOSIS — Z74.09 IMPAIRED FUNCTIONAL MOBILITY, BALANCE, GAIT, AND ENDURANCE: ICD-10-CM

## 2023-06-22 DIAGNOSIS — R26.2 DIFFICULTY IN WALKING: Primary | ICD-10-CM

## 2023-06-22 DIAGNOSIS — R20.2 LEFT HAND PARESTHESIA: ICD-10-CM

## 2023-06-22 DIAGNOSIS — M25.469 KNEE SWELLING: ICD-10-CM

## 2023-06-22 DIAGNOSIS — M25.561 PAIN IN BOTH KNEES, UNSPECIFIED CHRONICITY: ICD-10-CM

## 2023-06-22 PROCEDURE — 99213 OFFICE O/P EST LOW 20 MIN: CPT | Performed by: INTERNAL MEDICINE

## 2023-06-22 PROCEDURE — 3078F DIAST BP <80 MM HG: CPT | Performed by: INTERNAL MEDICINE

## 2023-06-22 PROCEDURE — 3075F SYST BP GE 130 - 139MM HG: CPT | Performed by: INTERNAL MEDICINE

## 2023-06-22 SDOH — ECONOMIC STABILITY: FOOD INSECURITY: WITHIN THE PAST 12 MONTHS, THE FOOD YOU BOUGHT JUST DIDN'T LAST AND YOU DIDN'T HAVE MONEY TO GET MORE.: NEVER TRUE

## 2023-06-22 SDOH — ECONOMIC STABILITY: FOOD INSECURITY: WITHIN THE PAST 12 MONTHS, YOU WORRIED THAT YOUR FOOD WOULD RUN OUT BEFORE YOU GOT MONEY TO BUY MORE.: NEVER TRUE

## 2023-06-22 SDOH — ECONOMIC STABILITY: INCOME INSECURITY: HOW HARD IS IT FOR YOU TO PAY FOR THE VERY BASICS LIKE FOOD, HOUSING, MEDICAL CARE, AND HEATING?: NOT HARD AT ALL

## 2023-06-22 ASSESSMENT — PATIENT HEALTH QUESTIONNAIRE - PHQ9
SUM OF ALL RESPONSES TO PHQ9 QUESTIONS 1 & 2: 0
2. FEELING DOWN, DEPRESSED OR HOPELESS: 0
1. LITTLE INTEREST OR PLEASURE IN DOING THINGS: 0
SUM OF ALL RESPONSES TO PHQ QUESTIONS 1-9: 0

## 2023-06-22 ASSESSMENT — ANXIETY QUESTIONNAIRES
6. BECOMING EASILY ANNOYED OR IRRITABLE: 0
3. WORRYING TOO MUCH ABOUT DIFFERENT THINGS: 0
7. FEELING AFRAID AS IF SOMETHING AWFUL MIGHT HAPPEN: 0
IF YOU CHECKED OFF ANY PROBLEMS ON THIS QUESTIONNAIRE, HOW DIFFICULT HAVE THESE PROBLEMS MADE IT FOR YOU TO DO YOUR WORK, TAKE CARE OF THINGS AT HOME, OR GET ALONG WITH OTHER PEOPLE: NOT DIFFICULT AT ALL
4. TROUBLE RELAXING: 0
2. NOT BEING ABLE TO STOP OR CONTROL WORRYING: 0
5. BEING SO RESTLESS THAT IT IS HARD TO SIT STILL: 0
1. FEELING NERVOUS, ANXIOUS, OR ON EDGE: 0
GAD7 TOTAL SCORE: 0

## 2023-06-22 ASSESSMENT — ENCOUNTER SYMPTOMS
SHORTNESS OF BREATH: 0
BLOOD IN STOOL: 0

## 2023-06-22 NOTE — PROGRESS NOTES
Chase Barrera M.D. Internal Medicine  [address], [address]  Phone: [phone]  Fax: [phone]    Skin Problem  This is a new problem. The current episode started in the past 7 days. The problem is unchanged. The affected locations include the scalp and face. The rash is characterized by dryness and itchiness. Associated with: Yard chemical. Pertinent negatives include no fever or shortness of breath. Treatments tried: Alcohol (topical). The treatment provided no relief. Lisa Cha is a 62 y.o. Black / [place] American female. Current Outpatient Medications   Medication Sig Dispense Refill    ticagrelor (BRILINTA) 90 MG TABS tablet Take 1 tablet by mouth 2 times daily 180 tablet 3    metoprolol succinate (TOPROL XL) 25 MG extended release tablet Take 1 tablet by mouth daily 90 tablet 3    ranolazine (RANEXA) 500 MG extended release tablet Take 1 tablet by mouth 2 times daily 180 tablet 3    rosuvastatin (CRESTOR) 20 MG tablet Take 1 tablet by mouth daily 90 tablet 3    nitroGLYCERIN (NITROSTAT) 0.4 MG SL tablet Place 1 tablet under the tongue every 5 minutes as needed for Chest pain 25 tablet 5    buPROPion (WELLBUTRIN XL) 150 MG extended release tablet Take 1 tablet by mouth every morning 90 tablet 1    esomeprazole (NEXIUM) 40 MG delayed release capsule Take 1 capsule by mouth every morning (before breakfast) 30 capsule 5    busPIRone (BUSPAR) 10 MG tablet tid 90 tablet 5    umeclidinium-vilanterol (ANORO ELLIPTA) 62.5-25 MCG/INH AEPB inhaler Inhale 1 puff into the lungs in the morning. 30 each 5    aspirin 81 MG EC tablet Take 1 tablet by mouth daily       No current facility-administered medications for this visit.      No Known Allergies  Past Medical History:   Diagnosis Date    Anemia     not currently taking iron supplements; had hysterectomy    Arthritis     foot    Benign essential tremor 3/15/2018    CAD (coronary artery disease) 2/19/2021

## 2023-06-22 NOTE — PROGRESS NOTES
with continued HEP. GOALS     Short term goals to be met by 4/11/2023  (4 weeks):  Pt will demonstrate good recall of HEP requiring minimal verbal cuing for proper form and technique. - MET 4/27/23  Pt will be able to modify activities in order keep pain to minimal levels (= 5/10) with ADLs. - MET 4/27/23  Patient will achieve 0° active knee extension to normalize gait pattern. - MET 4/27/23  Pt will increase strength of bilat LE to >/= 4/5 for improved tolerance to walking. - Partially Met 4/27/23    Short term goals to be met by 5/9/2023  (8 weeks):   Pt will independently ascend and descend steps with reciprocal pattern demonstrating good control and balance using rails only for balance. Goal Met 5/11/2023   Pt will demonstrate single leg stance 5 sec or greater each LE for improved tolerance to uneven surfaces and increased single leg activities. 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 Goal Met 5/11/2023   Pt will report pain decreased to intermittent with current activity level indicating improved joint function. Goal Met 5/11/2023     Long term goals to be met by 6/6/2023  (12 weeks):   Pt will return to full work duty with min/no modifications for knee pain or instability. Goal Met 6/22/2023   Pt will report return to previous level of function without c/o pain. Goal Met 6/22/2023   Improve LEFS to < 15% functional restrictions with ADLs, work and athletic activities. Goal Met 6/22/2023   Pt will report no episodes of knee giving way for 3 weeks or greater with participation in full PLOF. GOAL DISCONTINUED 6/22/2023 - some giving way but much improved  Discharged from PT. Goal Met 6/22/2023     [employer]  Access Code: [email or web address]  URL: [email or web address]  Date: 06/22/2023  Prepared by: Jose Zhang    Exercises  - Seated Hamstring Stretch  - 1 x daily - 2-3 sets - 20 sec hold  -

## 2023-08-29 ENCOUNTER — OFFICE VISIT (OUTPATIENT)
Dept: INTERNAL MEDICINE CLINIC | Facility: CLINIC | Age: 58
End: 2023-08-29
Payer: COMMERCIAL

## 2023-08-29 VITALS
BODY MASS INDEX: 27.62 KG/M2 | HEIGHT: 59 IN | SYSTOLIC BLOOD PRESSURE: 127 MMHG | OXYGEN SATURATION: 96 % | WEIGHT: 137 LBS | HEART RATE: 68 BPM | DIASTOLIC BLOOD PRESSURE: 85 MMHG | TEMPERATURE: 97.3 F

## 2023-08-29 DIAGNOSIS — I70.0 ATHEROSCLEROSIS OF ABDOMINAL AORTA (HCC): ICD-10-CM

## 2023-08-29 DIAGNOSIS — E78.2 MIXED HYPERLIPIDEMIA: ICD-10-CM

## 2023-08-29 DIAGNOSIS — I25.118 CORONARY ARTERY DISEASE OF NATIVE ARTERY OF NATIVE HEART WITH STABLE ANGINA PECTORIS (HCC): Primary | ICD-10-CM

## 2023-08-29 DIAGNOSIS — I10 ESSENTIAL HYPERTENSION: ICD-10-CM

## 2023-08-29 DIAGNOSIS — Z87.891 PERSONAL HISTORY OF TOBACCO USE: ICD-10-CM

## 2023-08-29 DIAGNOSIS — I25.118 CORONARY ARTERY DISEASE OF NATIVE ARTERY OF NATIVE HEART WITH STABLE ANGINA PECTORIS (HCC): ICD-10-CM

## 2023-08-29 DIAGNOSIS — J43.1 PANLOBULAR EMPHYSEMA (HCC): ICD-10-CM

## 2023-08-29 LAB
ALBUMIN SERPL-MCNC: 3.9 G/DL (ref 3.5–5)
ALBUMIN/GLOB SERPL: 1.3 (ref 0.4–1.6)
ALP SERPL-CCNC: 52 U/L (ref 50–136)
ALT SERPL-CCNC: 23 U/L (ref 12–65)
ANION GAP SERPL CALC-SCNC: 7 MMOL/L (ref 2–11)
APPEARANCE UR: CLEAR
AST SERPL-CCNC: 17 U/L (ref 15–37)
BASOPHILS # BLD: 0 K/UL (ref 0–0.2)
BASOPHILS NFR BLD: 1 % (ref 0–2)
BILIRUB DIRECT SERPL-MCNC: 0.1 MG/DL
BILIRUB SERPL-MCNC: 0.4 MG/DL (ref 0.2–1.1)
BILIRUB UR QL: NEGATIVE
BUN SERPL-MCNC: 12 MG/DL (ref 6–23)
CALCIUM SERPL-MCNC: 9.4 MG/DL (ref 8.3–10.4)
CHLORIDE SERPL-SCNC: 108 MMOL/L (ref 101–110)
CHOLEST SERPL-MCNC: 172 MG/DL
CO2 SERPL-SCNC: 28 MMOL/L (ref 21–32)
COLOR UR: NORMAL
CREAT SERPL-MCNC: 0.7 MG/DL (ref 0.6–1)
DIFFERENTIAL METHOD BLD: ABNORMAL
EOSINOPHIL # BLD: 0.1 K/UL (ref 0–0.8)
EOSINOPHIL NFR BLD: 1 % (ref 0.5–7.8)
ERYTHROCYTE [DISTWIDTH] IN BLOOD BY AUTOMATED COUNT: 14.6 % (ref 11.9–14.6)
GLOBULIN SER CALC-MCNC: 2.9 G/DL (ref 2.8–4.5)
GLUCOSE SERPL-MCNC: 84 MG/DL (ref 65–100)
GLUCOSE UR STRIP.AUTO-MCNC: NEGATIVE MG/DL
HCT VFR BLD AUTO: 40 % (ref 35.8–46.3)
HDLC SERPL-MCNC: 103 MG/DL (ref 40–60)
HDLC SERPL: 1.7
HGB BLD-MCNC: 12.9 G/DL (ref 11.7–15.4)
HGB UR QL STRIP: NEGATIVE
IMM GRANULOCYTES # BLD AUTO: 0 K/UL (ref 0–0.5)
IMM GRANULOCYTES NFR BLD AUTO: 0 % (ref 0–5)
KETONES UR QL STRIP.AUTO: NEGATIVE MG/DL
LDLC SERPL CALC-MCNC: 60.8 MG/DL
LEUKOCYTE ESTERASE UR QL STRIP.AUTO: NEGATIVE
LYMPHOCYTES # BLD: 2.9 K/UL (ref 0.5–4.6)
LYMPHOCYTES NFR BLD: 51 % (ref 13–44)
MCH RBC QN AUTO: 23.4 PG (ref 26.1–32.9)
MCHC RBC AUTO-ENTMCNC: 32.3 G/DL (ref 31.4–35)
MCV RBC AUTO: 72.5 FL (ref 82–102)
MONOCYTES # BLD: 0.4 K/UL (ref 0.1–1.3)
MONOCYTES NFR BLD: 7 % (ref 4–12)
NEUTS SEG # BLD: 2.3 K/UL (ref 1.7–8.2)
NEUTS SEG NFR BLD: 40 % (ref 43–78)
NITRITE UR QL STRIP.AUTO: NEGATIVE
NRBC # BLD: 0 K/UL (ref 0–0.2)
PH UR STRIP: 7 (ref 5–9)
PLATELET # BLD AUTO: 173 K/UL (ref 150–450)
PMV BLD AUTO: 11.3 FL (ref 9.4–12.3)
POTASSIUM SERPL-SCNC: 4.1 MMOL/L (ref 3.5–5.1)
PROT SERPL-MCNC: 6.8 G/DL (ref 6.3–8.2)
PROT UR STRIP-MCNC: NEGATIVE MG/DL
RBC # BLD AUTO: 5.52 M/UL (ref 4.05–5.2)
SODIUM SERPL-SCNC: 143 MMOL/L (ref 133–143)
SP GR UR REFRACTOMETRY: 1.02 (ref 1–1.02)
TRIGL SERPL-MCNC: 41 MG/DL (ref 35–150)
TSH, 3RD GENERATION: 1.35 UIU/ML (ref 0.36–3.74)
UROBILINOGEN UR QL STRIP.AUTO: 0.2 EU/DL (ref 0.2–1)
VLDLC SERPL CALC-MCNC: 8.2 MG/DL (ref 6–23)
WBC # BLD AUTO: 5.7 K/UL (ref 4.3–11.1)

## 2023-08-29 PROCEDURE — 3079F DIAST BP 80-89 MM HG: CPT | Performed by: INTERNAL MEDICINE

## 2023-08-29 PROCEDURE — 3074F SYST BP LT 130 MM HG: CPT | Performed by: INTERNAL MEDICINE

## 2023-08-29 PROCEDURE — 99214 OFFICE O/P EST MOD 30 MIN: CPT | Performed by: INTERNAL MEDICINE

## 2023-08-29 RX ORDER — BUPROPION HYDROCHLORIDE 300 MG/1
300 TABLET ORAL EVERY MORNING
Qty: 90 TABLET | Refills: 1 | Status: SHIPPED | OUTPATIENT
Start: 2023-08-29

## 2023-08-29 RX ORDER — SALICYLIC ACID 40 G/100G
PLASTER TOPICAL
COMMUNITY
Start: 2023-08-21

## 2023-08-29 RX ORDER — UMECLIDINIUM BROMIDE AND VILANTEROL TRIFENATATE 62.5; 25 UG/1; UG/1
1 POWDER RESPIRATORY (INHALATION) DAILY
Qty: 30 EACH | Refills: 5 | Status: SHIPPED | OUTPATIENT
Start: 2023-08-29

## 2023-08-29 SDOH — ECONOMIC STABILITY: FOOD INSECURITY: WITHIN THE PAST 12 MONTHS, THE FOOD YOU BOUGHT JUST DIDN'T LAST AND YOU DIDN'T HAVE MONEY TO GET MORE.: NEVER TRUE

## 2023-08-29 SDOH — ECONOMIC STABILITY: INCOME INSECURITY: HOW HARD IS IT FOR YOU TO PAY FOR THE VERY BASICS LIKE FOOD, HOUSING, MEDICAL CARE, AND HEATING?: NOT HARD AT ALL

## 2023-08-29 SDOH — ECONOMIC STABILITY: FOOD INSECURITY: WITHIN THE PAST 12 MONTHS, YOU WORRIED THAT YOUR FOOD WOULD RUN OUT BEFORE YOU GOT MONEY TO BUY MORE.: NEVER TRUE

## 2023-08-29 ASSESSMENT — PATIENT HEALTH QUESTIONNAIRE - PHQ9
SUM OF ALL RESPONSES TO PHQ QUESTIONS 1-9: 0
SUM OF ALL RESPONSES TO PHQ QUESTIONS 1-9: 0
2. FEELING DOWN, DEPRESSED OR HOPELESS: 0
1. LITTLE INTEREST OR PLEASURE IN DOING THINGS: 0
SUM OF ALL RESPONSES TO PHQ9 QUESTIONS 1 & 2: 0
SUM OF ALL RESPONSES TO PHQ QUESTIONS 1-9: 0
SUM OF ALL RESPONSES TO PHQ QUESTIONS 1-9: 0

## 2023-08-29 ASSESSMENT — ENCOUNTER SYMPTOMS
BLOOD IN STOOL: 0
SHORTNESS OF BREATH: 0

## 2023-08-29 ASSESSMENT — ANXIETY QUESTIONNAIRES
4. TROUBLE RELAXING: 0
7. FEELING AFRAID AS IF SOMETHING AWFUL MIGHT HAPPEN: 0
1. FEELING NERVOUS, ANXIOUS, OR ON EDGE: 0
3. WORRYING TOO MUCH ABOUT DIFFERENT THINGS: 0
GAD7 TOTAL SCORE: 0
2. NOT BEING ABLE TO STOP OR CONTROL WORRYING: 0
5. BEING SO RESTLESS THAT IT IS HARD TO SIT STILL: 0
IF YOU CHECKED OFF ANY PROBLEMS ON THIS QUESTIONNAIRE, HOW DIFFICULT HAVE THESE PROBLEMS MADE IT FOR YOU TO DO YOUR WORK, TAKE CARE OF THINGS AT HOME, OR GET ALONG WITH OTHER PEOPLE: NOT DIFFICULT AT ALL
6. BECOMING EASILY ANNOYED OR IRRITABLE: 0

## 2023-08-29 NOTE — PROGRESS NOTES
atraumatic. Right Ear: External ear normal.      Left Ear: External ear normal.   Eyes:      General: No scleral icterus. Right eye: No discharge. Left eye: No discharge. Conjunctiva/sclera: Conjunctivae normal.   Cardiovascular:      Rate and Rhythm: Normal rate and regular rhythm. Heart sounds: Normal heart sounds. No murmur heard. No friction rub. No gallop. Pulmonary:      Effort: Pulmonary effort is normal. No respiratory distress. Breath sounds: Normal breath sounds. No stridor. No wheezing, rhonchi or rales. Abdominal:      General: Abdomen is flat. Bowel sounds are normal. There is no distension. Palpations: Abdomen is soft. There is no mass. Tenderness: There is no abdominal tenderness. There is no guarding or rebound. Musculoskeletal:      Right lower leg: No edema. Left lower leg: No edema. Skin:     General: Skin is warm and dry. Coloration: Skin is not jaundiced or pale. Findings: No erythema. Neurological:      General: No focal deficit present. Mental Status: She is alert and oriented to person, place, and time. Mental status is at baseline. Psychiatric:         Mood and Affect: Mood normal.         Behavior: Behavior normal.         Thought Content: Thought content normal.         Judgment: Judgment normal.   I have reviewed/discussed the above normal BMI with the patient. I have recommended the following interventions: dietary management education, guidance, and counseling and encourage exercise . ASSESSMENT AND PLAN:    CAD: KELLY to LAD 2/19/21. Follows with Cardiology (Dr. Owen Gamboa). Maintained on Asa/Brilinta. On Ranexa for chronic stable angina. Risk factors medically modified per below. Taking medications w/o problems. Well controlled w/o angina or STRATTON. Continue Asa/Brilinta (No bleeding) per Cardiology. Continue risk factor modifications per below.   Follow up with Dr. Owen Gamboa (Also with aortic

## 2023-08-31 ENCOUNTER — PROCEDURE VISIT (OUTPATIENT)
Dept: PHYSICAL MEDICINE AND REHAB | Age: 58
End: 2023-08-31

## 2023-08-31 VITALS
BODY MASS INDEX: 27.62 KG/M2 | HEIGHT: 59 IN | WEIGHT: 137 LBS | DIASTOLIC BLOOD PRESSURE: 79 MMHG | SYSTOLIC BLOOD PRESSURE: 115 MMHG

## 2023-08-31 DIAGNOSIS — R20.2 NUMBNESS AND TINGLING IN BOTH HANDS: Primary | ICD-10-CM

## 2023-08-31 DIAGNOSIS — R20.0 NUMBNESS AND TINGLING IN BOTH HANDS: Primary | ICD-10-CM

## 2023-09-07 ENCOUNTER — OFFICE VISIT (OUTPATIENT)
Dept: ENT CLINIC | Age: 58
End: 2023-09-07
Payer: COMMERCIAL

## 2023-09-07 DIAGNOSIS — K21.9 LPRD (LARYNGOPHARYNGEAL REFLUX DISEASE): Primary | Chronic | ICD-10-CM

## 2023-09-07 DIAGNOSIS — R07.0 THROAT BURNING: Chronic | ICD-10-CM

## 2023-09-07 PROCEDURE — 99214 OFFICE O/P EST MOD 30 MIN: CPT | Performed by: PHYSICIAN ASSISTANT

## 2023-09-07 RX ORDER — ESOMEPRAZOLE MAGNESIUM 40 MG/1
40 CAPSULE, DELAYED RELEASE ORAL
Qty: 30 CAPSULE | Refills: 5 | Status: SHIPPED | OUTPATIENT
Start: 2023-09-07

## 2023-09-07 ASSESSMENT — ENCOUNTER SYMPTOMS
SORE THROAT: 0
SHORTNESS OF BREATH: 0
ABDOMINAL PAIN: 0

## 2023-09-07 NOTE — PROGRESS NOTES
Sophia Gill is a 62 y.o. female presents today for recheck of reflux, she feels very well controlled with Nexium and Pepcid daily. No more sx of heartburn, no dysphagia, hoarseness. Her voice seems a bit deeper than it used to but never aphonic. Chief Complaint   Patient presents with    Follow-up     Patient states that she is doing better. Patient Active Problem List   Diagnosis    Coronary artery disease of native artery of native heart with stable angina pectoris ([address])    Essential hypertension    Mixed hyperlipidemia    Tobacco use    Atherosclerosis of abdominal aorta (HCC)    Panlobular emphysema ([address])        Reviewed and updated this visit by provider:  Tobacco  Allergies  Meds  Problems  Med Hx  Surg Hx  Fam Hx         Review of Systems   Constitutional:  Negative for fever. HENT:  Negative for ear pain and sore throat. Eyes:  Negative for visual disturbance. Respiratory:  Negative for shortness of breath. Cardiovascular:  Negative for chest pain. Gastrointestinal:  Negative for abdominal pain. Skin:  Negative for rash. Neurological:  Negative for dizziness and headaches. Hematological:  Negative for adenopathy. Psychiatric/Behavioral:  Negative for agitation. There were no vitals taken for this visit. Physical Exam:    General: Well developed, well nourished, in no acute distress  Communication: The patient communicates appropriately for their age. Voice: Normal.  Head, Face, and Salivary Glands: No head or facial abnormalities present, No masses or lesions present, Overall appearance is normal, No abnormality of parotid or submandibular glands present. External Ears: appearance is normal with no scars, lesions or masses. Right Ear:  Canals is normal, Tympanic membrane with normal landmarks and normal mobility, no retraction, inflammation, effusion. Left  Ear: Canal is completely blocked with wax.      Nose/Nasal Cavity: Nasal mucosa is pink/

## 2023-10-18 NOTE — PROGRESS NOTES
Conjunctiva/sclera: Conjunctivae normal.   Neck:      Vascular: No carotid bruit. Cardiovascular:      Rate and Rhythm: Normal rate and regular rhythm. Heart sounds: No murmur heard. No friction rub. No gallop. Pulmonary:      Effort: No respiratory distress. Breath sounds: No wheezing or rales. Musculoskeletal:         General: No swelling. Cervical back: Neck supple. Skin:     General: Skin is warm and dry. Neurological:      General: No focal deficit present. Mental Status: She is alert. Psychiatric:         Mood and Affect: Mood normal.         Behavior: Behavior normal.       Medical problems and test results were reviewed with the patient today.      DATA REVIEW    LIPID PANEL     Lab Results   Component Value Date    CHOL 172 08/29/2023    CHOL 176 08/10/2022    CHOL 161 08/10/2021     Lab Results   Component Value Date    TRIG 41 08/29/2023    TRIG 49 08/10/2022    TRIG 37 08/10/2021     Lab Results   Component Value Date     (H) 08/29/2023    HDL 93 (H) 08/10/2022    HDL 86 08/10/2021     Lab Results   Component Value Date    LDLCALC 60.8 08/29/2023    LDLCALC 73.2 08/10/2022    LDLCALC 66 08/10/2021     Lab Results   Component Value Date    LABVLDL 8.2 08/29/2023    LABVLDL 9.8 08/10/2022    VLDL 9 08/10/2021    VLDL 9 11/25/2020     Lab Results   Component Value Date    CHOLHDLRATIO 1.7 08/29/2023    CHOLHDLRATIO 1.9 08/10/2022       BMP  Lab Results   Component Value Date/Time     08/29/2023 03:59 PM    K 4.1 08/29/2023 03:59 PM     08/29/2023 03:59 PM    CO2 28 08/29/2023 03:59 PM    BUN 12 08/29/2023 03:59 PM    CREATININE 0.70 08/29/2023 03:59 PM    GLUCOSE 84 08/29/2023 03:59 PM    CALCIUM 9.4 08/29/2023 03:59 PM          EKG        CXR/IMAGING        DEVICE INTERROGATION        OUTSIDE RECORDS REVIEW    Records from outside providers have been reviewed and summarized as noted in the HPI, past history and data review sections of this note, and

## 2023-10-19 ENCOUNTER — OFFICE VISIT (OUTPATIENT)
Age: 58
End: 2023-10-19
Payer: COMMERCIAL

## 2023-10-19 VITALS
WEIGHT: 139 LBS | BODY MASS INDEX: 28.07 KG/M2 | DIASTOLIC BLOOD PRESSURE: 60 MMHG | HEART RATE: 61 BPM | SYSTOLIC BLOOD PRESSURE: 128 MMHG

## 2023-10-19 DIAGNOSIS — I10 ESSENTIAL HYPERTENSION: ICD-10-CM

## 2023-10-19 DIAGNOSIS — I25.118 CORONARY ARTERY DISEASE OF NATIVE ARTERY OF NATIVE HEART WITH STABLE ANGINA PECTORIS (HCC): Primary | ICD-10-CM

## 2023-10-19 DIAGNOSIS — Z87.891 FORMER TOBACCO USE: ICD-10-CM

## 2023-10-19 DIAGNOSIS — E78.2 MIXED HYPERLIPIDEMIA: ICD-10-CM

## 2023-10-19 PROCEDURE — 3074F SYST BP LT 130 MM HG: CPT | Performed by: INTERNAL MEDICINE

## 2023-10-19 PROCEDURE — 99214 OFFICE O/P EST MOD 30 MIN: CPT | Performed by: INTERNAL MEDICINE

## 2023-10-19 PROCEDURE — 3078F DIAST BP <80 MM HG: CPT | Performed by: INTERNAL MEDICINE

## 2023-10-19 ASSESSMENT — ENCOUNTER SYMPTOMS: SHORTNESS OF BREATH: 0

## 2024-02-28 ENCOUNTER — OFFICE VISIT (OUTPATIENT)
Dept: INTERNAL MEDICINE CLINIC | Facility: CLINIC | Age: 59
End: 2024-02-28
Payer: COMMERCIAL

## 2024-02-28 VITALS
BODY MASS INDEX: 29.65 KG/M2 | HEIGHT: 59 IN | TEMPERATURE: 98.8 F | HEART RATE: 77 BPM | DIASTOLIC BLOOD PRESSURE: 75 MMHG | WEIGHT: 147.1 LBS | SYSTOLIC BLOOD PRESSURE: 107 MMHG | OXYGEN SATURATION: 95 %

## 2024-02-28 DIAGNOSIS — J01.00 ACUTE MAXILLARY SINUSITIS, RECURRENCE NOT SPECIFIED: Primary | ICD-10-CM

## 2024-02-28 DIAGNOSIS — J02.9 SORE THROAT: ICD-10-CM

## 2024-02-28 DIAGNOSIS — R11.0 NAUSEA: ICD-10-CM

## 2024-02-28 DIAGNOSIS — H65.191 OTHER ACUTE NONSUPPURATIVE OTITIS MEDIA OF RIGHT EAR, RECURRENCE NOT SPECIFIED: ICD-10-CM

## 2024-02-28 DIAGNOSIS — R05.1 ACUTE COUGH: ICD-10-CM

## 2024-02-28 DIAGNOSIS — R09.81 NASAL CONGESTION: ICD-10-CM

## 2024-02-28 LAB
GROUP A STREP ANTIGEN, POC: NEGATIVE
INFLUENZA A ANTIGEN, POC: NEGATIVE
INFLUENZA B ANTIGEN, POC: NEGATIVE
LOT EXPIRE DATE: 0
LOT KIT NUMBER: 0
SARS-COV-2, POC: NORMAL
VALID INTERNAL CONTROL, POC: 0
VALID INTERNAL CONTROL, POC: 0
VALID INTERNAL CONTROL: 0
VENDOR AND KIT NAME POC: NORMAL

## 2024-02-28 PROCEDURE — 87880 STREP A ASSAY W/OPTIC: CPT | Performed by: NURSE PRACTITIONER

## 2024-02-28 PROCEDURE — 3074F SYST BP LT 130 MM HG: CPT | Performed by: NURSE PRACTITIONER

## 2024-02-28 PROCEDURE — 87426 SARSCOV CORONAVIRUS AG IA: CPT | Performed by: NURSE PRACTITIONER

## 2024-02-28 PROCEDURE — 3078F DIAST BP <80 MM HG: CPT | Performed by: NURSE PRACTITIONER

## 2024-02-28 PROCEDURE — 99214 OFFICE O/P EST MOD 30 MIN: CPT | Performed by: NURSE PRACTITIONER

## 2024-02-28 PROCEDURE — 87804 INFLUENZA ASSAY W/OPTIC: CPT | Performed by: NURSE PRACTITIONER

## 2024-02-28 RX ORDER — BENZONATATE 100 MG/1
100 CAPSULE ORAL 3 TIMES DAILY PRN
Qty: 30 CAPSULE | Refills: 0 | Status: SHIPPED | OUTPATIENT
Start: 2024-02-28 | End: 2024-03-09

## 2024-02-28 RX ORDER — ONDANSETRON 4 MG/1
4 TABLET, ORALLY DISINTEGRATING ORAL 3 TIMES DAILY PRN
Qty: 10 TABLET | Refills: 0 | Status: SHIPPED | OUTPATIENT
Start: 2024-02-28

## 2024-02-28 RX ORDER — AMOXICILLIN AND CLAVULANATE POTASSIUM 875; 125 MG/1; MG/1
1 TABLET, FILM COATED ORAL 2 TIMES DAILY
Qty: 20 TABLET | Refills: 0 | Status: SHIPPED | OUTPATIENT
Start: 2024-02-28 | End: 2024-03-09

## 2024-02-28 ASSESSMENT — ENCOUNTER SYMPTOMS
CONSTIPATION: 0
SORE THROAT: 1
NAUSEA: 0
RHINORRHEA: 0
BACK PAIN: 0
VOMITING: 0
SINUS PAIN: 0
DIARRHEA: 0
SHORTNESS OF BREATH: 0
ABDOMINAL PAIN: 0
EYE PAIN: 0
COUGH: 1

## 2024-02-28 NOTE — PROGRESS NOTES
MG TABS      6. Nausea  R11.0 ondansetron (ZOFRAN-ODT) 4 MG disintegrating tablet               Arlette was seen today for chest congestion.    Diagnoses and all orders for this visit:    Acute maxillary sinusitis, recurrence not specified  -     amoxicillin-clavulanate (AUGMENTIN) 875-125 MG per tablet; Take 1 tablet by mouth 2 times daily for 10 days    Other acute nonsuppurative otitis media of right ear, recurrence not specified  -     amoxicillin-clavulanate (AUGMENTIN) 875-125 MG per tablet; Take 1 tablet by mouth 2 times daily for 10 days    Acute cough  -     AMB POC RAPID INFLUENZA TEST  -     AMB POC SARS-COV-2  -     benzonatate (TESSALON) 100 MG capsule; Take 1 capsule by mouth 3 times daily as needed for Cough    Sore throat  -     AMB POC RAPID STREP A    Nasal congestion  -     Chlorphen-PE-Acetaminophen 4- MG TABS; Take 1 tablet by mouth 2 times daily as needed (nasal congestion)    Nausea  -     ondansetron (ZOFRAN-ODT) 4 MG disintegrating tablet; Take 1 tablet by mouth 3 times daily as needed for Nausea           Advised on hand and respiratory hygiene. Advised on rest, fluids, foods rich in Vitamin C.    Cont meds and treatment plan by PCP and specialists.    Strep test negative. Covid and Flu tests negative.       Orders Placed This Encounter   Procedures    AMB POC RAPID INFLUENZA TEST    AMB POC RAPID STREP A    AMB POC SARS-COV-2     Order Specific Question:   Pregnant?     Answer:   No     Order Specific Question:   Previously tested for COVID-19?     Answer:   Yes                  Follow Up  Return in about 1 day (around 2/29/2024), or if symptoms worsen or fail to improve, for Follow up with PCP Dr. MARCELL Ledesma.             Lillian Hooks, DNP, FNP-BC

## 2024-02-29 ENCOUNTER — OFFICE VISIT (OUTPATIENT)
Dept: INTERNAL MEDICINE CLINIC | Facility: CLINIC | Age: 59
End: 2024-02-29
Payer: COMMERCIAL

## 2024-02-29 VITALS
BODY MASS INDEX: 29.84 KG/M2 | HEART RATE: 89 BPM | HEIGHT: 59 IN | WEIGHT: 148 LBS | RESPIRATION RATE: 16 BRPM | TEMPERATURE: 98.2 F | DIASTOLIC BLOOD PRESSURE: 77 MMHG | OXYGEN SATURATION: 97 % | SYSTOLIC BLOOD PRESSURE: 113 MMHG

## 2024-02-29 DIAGNOSIS — I10 ESSENTIAL HYPERTENSION: ICD-10-CM

## 2024-02-29 DIAGNOSIS — J43.1 PANLOBULAR EMPHYSEMA (HCC): ICD-10-CM

## 2024-02-29 DIAGNOSIS — I25.118 CORONARY ARTERY DISEASE OF NATIVE ARTERY OF NATIVE HEART WITH STABLE ANGINA PECTORIS (HCC): ICD-10-CM

## 2024-02-29 DIAGNOSIS — E78.2 MIXED HYPERLIPIDEMIA: ICD-10-CM

## 2024-02-29 DIAGNOSIS — Z87.891 PERSONAL HISTORY OF TOBACCO USE: ICD-10-CM

## 2024-02-29 DIAGNOSIS — I70.0 ATHEROSCLEROSIS OF ABDOMINAL AORTA (HCC): ICD-10-CM

## 2024-02-29 DIAGNOSIS — I25.118 CORONARY ARTERY DISEASE OF NATIVE ARTERY OF NATIVE HEART WITH STABLE ANGINA PECTORIS (HCC): Primary | ICD-10-CM

## 2024-02-29 LAB
ALBUMIN SERPL-MCNC: 3.3 G/DL (ref 3.5–5)
ALBUMIN/GLOB SERPL: 0.9 (ref 0.4–1.6)
ALP SERPL-CCNC: 62 U/L (ref 50–136)
ALT SERPL-CCNC: 18 U/L (ref 12–65)
ANION GAP SERPL CALC-SCNC: 6 MMOL/L (ref 2–11)
AST SERPL-CCNC: 12 U/L (ref 15–37)
BASOPHILS # BLD: 0 K/UL (ref 0–0.2)
BASOPHILS NFR BLD: 1 % (ref 0–2)
BILIRUB DIRECT SERPL-MCNC: 0.1 MG/DL
BILIRUB SERPL-MCNC: 0.3 MG/DL (ref 0.2–1.1)
BUN SERPL-MCNC: 13 MG/DL (ref 6–23)
CALCIUM SERPL-MCNC: 9.4 MG/DL (ref 8.3–10.4)
CHLORIDE SERPL-SCNC: 112 MMOL/L (ref 103–113)
CO2 SERPL-SCNC: 26 MMOL/L (ref 21–32)
CREAT SERPL-MCNC: 0.7 MG/DL (ref 0.6–1)
DIFFERENTIAL METHOD BLD: ABNORMAL
EOSINOPHIL # BLD: 0.1 K/UL (ref 0–0.8)
EOSINOPHIL NFR BLD: 2 % (ref 0.5–7.8)
ERYTHROCYTE [DISTWIDTH] IN BLOOD BY AUTOMATED COUNT: 15.1 % (ref 11.9–14.6)
GLOBULIN SER CALC-MCNC: 3.7 G/DL (ref 2.8–4.5)
GLUCOSE SERPL-MCNC: 92 MG/DL (ref 65–100)
HCT VFR BLD AUTO: 40.6 % (ref 35.8–46.3)
HGB BLD-MCNC: 13 G/DL (ref 11.7–15.4)
IMM GRANULOCYTES # BLD AUTO: 0 K/UL (ref 0–0.5)
IMM GRANULOCYTES NFR BLD AUTO: 0 % (ref 0–5)
LYMPHOCYTES # BLD: 2 K/UL (ref 0.5–4.6)
LYMPHOCYTES NFR BLD: 29 % (ref 13–44)
MCH RBC QN AUTO: 22.8 PG (ref 26.1–32.9)
MCHC RBC AUTO-ENTMCNC: 32 G/DL (ref 31.4–35)
MCV RBC AUTO: 71.4 FL (ref 82–102)
MONOCYTES # BLD: 0.6 K/UL (ref 0.1–1.3)
MONOCYTES NFR BLD: 9 % (ref 4–12)
NEUTS SEG # BLD: 4.1 K/UL (ref 1.7–8.2)
NEUTS SEG NFR BLD: 59 % (ref 43–78)
NRBC # BLD: 0 K/UL (ref 0–0.2)
PLATELET # BLD AUTO: 184 K/UL (ref 150–450)
PMV BLD AUTO: 11 FL (ref 9.4–12.3)
POTASSIUM SERPL-SCNC: 3.9 MMOL/L (ref 3.5–5.1)
PROT SERPL-MCNC: 7 G/DL (ref 6.3–8.2)
RBC # BLD AUTO: 5.69 M/UL (ref 4.05–5.2)
SODIUM SERPL-SCNC: 144 MMOL/L (ref 136–146)
WBC # BLD AUTO: 6.9 K/UL (ref 4.3–11.1)

## 2024-02-29 PROCEDURE — G0296 VISIT TO DETERM LDCT ELIG: HCPCS | Performed by: INTERNAL MEDICINE

## 2024-02-29 PROCEDURE — 99214 OFFICE O/P EST MOD 30 MIN: CPT | Performed by: INTERNAL MEDICINE

## 2024-02-29 PROCEDURE — 3078F DIAST BP <80 MM HG: CPT | Performed by: INTERNAL MEDICINE

## 2024-02-29 PROCEDURE — 3074F SYST BP LT 130 MM HG: CPT | Performed by: INTERNAL MEDICINE

## 2024-02-29 RX ORDER — ALBUTEROL SULFATE 90 UG/1
2 AEROSOL, METERED RESPIRATORY (INHALATION) 4 TIMES DAILY PRN
Qty: 54 G | Refills: 1 | Status: SHIPPED | OUTPATIENT
Start: 2024-02-29

## 2024-02-29 RX ORDER — UMECLIDINIUM BROMIDE AND VILANTEROL TRIFENATATE 62.5; 25 UG/1; UG/1
1 POWDER RESPIRATORY (INHALATION) DAILY
Qty: 30 EACH | Refills: 5 | Status: SHIPPED | OUTPATIENT
Start: 2024-02-29

## 2024-02-29 ASSESSMENT — ENCOUNTER SYMPTOMS
SHORTNESS OF BREATH: 0
BLOOD IN STOOL: 0

## 2024-02-29 ASSESSMENT — PATIENT HEALTH QUESTIONNAIRE - PHQ9
1. LITTLE INTEREST OR PLEASURE IN DOING THINGS: 0
SUM OF ALL RESPONSES TO PHQ QUESTIONS 1-9: 0
SUM OF ALL RESPONSES TO PHQ9 QUESTIONS 1 & 2: 0
SUM OF ALL RESPONSES TO PHQ QUESTIONS 1-9: 0
2. FEELING DOWN, DEPRESSED OR HOPELESS: 0

## 2024-02-29 NOTE — PROGRESS NOTES
Hepatic Function Panel; Future    Panlobular emphysema (HCC)  -     umeclidinium-vilanterol (ANORO ELLIPTA) 62.5-25 MCG/ACT inhaler; Inhale 1 puff into the lungs daily  -     albuterol sulfate HFA (VENTOLIN HFA) 108 (90 Base) MCG/ACT inhaler; Inhale 2 puffs into the lungs 4 times daily as needed for Wheezing  -     Basic Metabolic Panel; Future  -     CBC with Auto Differential; Future  -     Hepatic Function Panel; Future    Essential hypertension  -     Basic Metabolic Panel; Future  -     CBC with Auto Differential; Future  -     Hepatic Function Panel; Future    Mixed hyperlipidemia  -     Basic Metabolic Panel; Future  -     CBC with Auto Differential; Future  -     Hepatic Function Panel; Future    Atherosclerosis of abdominal aorta (HCC)  -     Basic Metabolic Panel; Future  -     CBC with Auto Differential; Future  -     Hepatic Function Panel; Future    Personal history of tobacco use  -     MI VISIT TO DISCUSS LUNG CA SCREEN W LDCT  -     CT Lung Screen (Initial/Annual/Baseline); Future      Return in about 6 months (around 8/29/2024).     Discussed with the patient the current USPSTF guidelines released March 9, 2021 for screening for lung cancer.    For adults aged 50 to 80 years who have a 20 pack-year smoking history and currently smoke or have quit within the past 15 years the grade B recommendation is to:  Screen for lung cancer with low-dose computed tomography (LDCT) every year.  Stop screening once a person has not smoked for 15 years or has a health problem that limits life expectancy or the ability to have lung surgery.    The patient  reports that she quit smoking about 10 months ago. Her smoking use included cigarettes. She started smoking about 42 years ago. She has a 20.6 pack-year smoking history. She has never used smokeless tobacco.. Discussed with patient the risks and benefits of screening, including over-diagnosis, false positive rate, and total radiation exposure.  The patient

## 2024-02-29 NOTE — PATIENT INSTRUCTIONS
cancer and other health problems. But in most cases, the risk of getting cancer from being exposed to small amounts of radiation is low. It's not a reason to avoid these tests for most people.  What are the benefits of screening?  Your scan may be normal (negative).  For some people who are at higher risk, screening lowers the chance of dying of lung cancer. How much and how long you smoked helps to determine your risk level. Screening can find some cancers early, when treatment may be more likely to work.  What happens after screening?  The results of your CT scan will be sent to your doctor. Someone from your care team will explain the results of your scan and answer any questions you may have. If you need any follow-up, he or she will help you understand what to do next.  After a lung cancer screening, you can go back to your usual activities right away.  A lung cancer screening test can't tell if you have lung cancer. If your results are positive, your doctor can't tell whether an abnormal finding is a harmless nodule, cancer, or something else without doing more tests.  What can you do to help prevent lung cancer?  Some lung cancers can't be prevented. But if you smoke, quitting smoking is the best step you can take to prevent lung cancer. If you want to quit, your doctor can recommend medicines or other ways to help.  Follow-up care is a key part of your treatment and safety. Be sure to make and go to all appointments, and call your doctor if you are having problems. It's also a good idea to know your test results and keep a list of the medicines you take.  Where can you learn more?  Go to https://www.[employer].net/patientEd and enter Q940 to learn more about \"Learning About Lung Cancer Screening.\"  Current as of: February 28, 2023               Content Version: 13.9  © 2672-5231 [employer].   Care instructions adapted under license by [employer]. If you have questions about a medical

## 2024-04-04 ENCOUNTER — OFFICE VISIT (OUTPATIENT)
Dept: OBGYN CLINIC | Age: 59
End: 2024-04-04
Payer: COMMERCIAL

## 2024-04-04 VITALS
WEIGHT: 144 LBS | HEIGHT: 59 IN | BODY MASS INDEX: 29.03 KG/M2 | DIASTOLIC BLOOD PRESSURE: 72 MMHG | SYSTOLIC BLOOD PRESSURE: 126 MMHG

## 2024-04-04 DIAGNOSIS — Z12.31 ENCOUNTER FOR SCREENING MAMMOGRAM FOR MALIGNANT NEOPLASM OF BREAST: ICD-10-CM

## 2024-04-04 DIAGNOSIS — Z12.11 SCREENING FOR COLON CANCER: ICD-10-CM

## 2024-04-04 DIAGNOSIS — Z01.419 WELL WOMAN EXAM WITH ROUTINE GYNECOLOGICAL EXAM: Primary | ICD-10-CM

## 2024-04-04 PROCEDURE — 99396 PREV VISIT EST AGE 40-64: CPT | Performed by: OBSTETRICS & GYNECOLOGY

## 2024-04-04 PROCEDURE — 99459 PELVIC EXAMINATION: CPT | Performed by: OBSTETRICS & GYNECOLOGY

## 2024-04-04 PROCEDURE — 3074F SYST BP LT 130 MM HG: CPT | Performed by: OBSTETRICS & GYNECOLOGY

## 2024-04-04 PROCEDURE — 3078F DIAST BP <80 MM HG: CPT | Performed by: OBSTETRICS & GYNECOLOGY

## 2024-04-04 ASSESSMENT — ENCOUNTER SYMPTOMS
BLOOD IN STOOL: 0
ABDOMINAL PAIN: 0
RESPIRATORY NEGATIVE: 1
COUGH: 0
SHORTNESS OF BREATH: 0
GASTROINTESTINAL NEGATIVE: 1

## 2024-04-04 NOTE — PROGRESS NOTES
Arlette Perez is 59 y.o. female,   who presents today for a routine annual gynecological examination.No LMP recorded. Patient has had a hysterectomy. ([facility] only ).. Missed her MMG last year. Doing well.  Not on HRT, doing well.  Followed by PCP. No Gyn, Breast, G/U, or GI complaints.           2024     9:00 AM 4/3/2023     9:00 AM   Mammogram/Pap Hx   Mammogram Date 2022   Mammogram Result neg negative   Pap Date 3/11/2021 3/11/2021   Pap Result wnl negative         2024     9:00 AM 4/3/2023     9:00 AM   GYN Intake Questionnaire   Current Form of Contraception Hysterectomy Hysterectomy   Dyspareunia None    Post-Coital Bleeding None    Date of Mammogram 2022   Result of Mammogram neg negative   Date of Pap 3/11/2021 3/11/2021   Pap result wnl negative       Contraception:  hysterectomy  Has received Gardisil: NO  Last [place]  Date of last Colonoscopy: 2015   No cologuard on file  No FIT/FOBT on file   No flexible sigmoidoscopy on file  Date of last Mammogram: 2022    Last time cholesterol was checked: 1 years ago    OB History:   OB History    Para Term  AB Living   3 3 3         SAB IAB Ectopic Molar Multiple Live Births                    # Outcome Date GA Lbr Jm/2nd Weight Sex Delivery Anes PTL Lv   3 Term    2.863 kg (6 lb 5 oz) M       2 Term            1 Term    2.438 kg (5 lb 6 oz) M             Health Maintenance Due   Topic Date Due    Low dose CT lung screening &/or counseling  2022    Breast cancer screen  2024         GYN History            Arlette  has a past medical history of Anemia, Arthritis, Benign essential tremor, CAD (coronary artery disease), GERD (gastroesophageal reflux disease), History of chicken pox, Hypertension, and OAB (overactive bladder). .    Her surgeries include  has a past surgical history that includes lap,tubal cautery; pr unlisted procedure cardiac surgery; Tubal ligation;

## 2024-04-24 NOTE — PROGRESS NOTES
81 MG EC tablet Take 1 tablet by mouth daily       Platelet Aggregation Inhibitors       ticagrelor (BRILINTA) 90 MG TABS tablet Take 1 tablet by mouth 2 times daily                  Past Medical History, Past Surgical History, Family history, Social History, and Medications were all reviewed with the patient today and updated as necessary.     Prior to Admission medications    Medication Sig Start Date End Date Taking? Authorizing Provider   metoprolol succinate (TOPROL XL) 25 MG extended release tablet Take 1 tablet by mouth daily 24  Yes Karen Sanchez MD   nitroGLYCERIN (NITROSTAT) 0.4 MG SL tablet Place 1 tablet under the tongue every 5 minutes as needed for Chest pain 24  Yes Karen Sanchez MD   rosuvastatin (CRESTOR) 20 MG tablet Take 1 tablet by mouth daily 24  Yes Karen Sanchez MD   ticagrelor (BRILINTA) 90 MG TABS tablet Take 1 tablet by mouth 2 times daily 24  Yes Karen Sanchez MD   umeclidinium-vilanterol (ANORO ELLIPTA) 62.5-25 MCG/ACT inhaler Inhale 1 puff into the lungs daily 24  Yes Miguel Ledesma MD   albuterol sulfate HFA (VENTOLIN HFA) 108 (90 Base) MCG/ACT inhaler Inhale 2 puffs into the lungs 4 times daily as needed for Wheezing 24  Yes Miguel Ledesma MD   esomeprazole (NEXIUM) 40 MG delayed release capsule Take 1 capsule by mouth every morning (before breakfast) 23  Yes Sandra Callahan PA   aspirin 81 MG EC tablet Take 1 tablet by mouth daily   Yes Automatic Reconciliation, Ar     No Known Allergies  Past Medical History:   Diagnosis Date    Anemia     not currently taking iron supplements; had hysterectomy    Arthritis     foot    Benign essential tremor 3/15/2018    CAD (coronary artery disease) 2021    GERD (gastroesophageal reflux disease)     no meds    History of chicken pox     Hypertension     pt denies    OAB (overactive bladder) 7/3/2014     Past Surgical History:   Procedure Laterality Date     SECTION

## 2024-04-25 ENCOUNTER — OFFICE VISIT (OUTPATIENT)
Age: 59
End: 2024-04-25
Payer: COMMERCIAL

## 2024-04-25 VITALS
WEIGHT: 146 LBS | DIASTOLIC BLOOD PRESSURE: 74 MMHG | HEART RATE: 54 BPM | HEIGHT: 59 IN | SYSTOLIC BLOOD PRESSURE: 138 MMHG | BODY MASS INDEX: 29.43 KG/M2

## 2024-04-25 DIAGNOSIS — E78.2 MIXED HYPERLIPIDEMIA: ICD-10-CM

## 2024-04-25 DIAGNOSIS — I10 ESSENTIAL HYPERTENSION: ICD-10-CM

## 2024-04-25 DIAGNOSIS — I70.0 ATHEROSCLEROSIS OF ABDOMINAL AORTA (HCC): ICD-10-CM

## 2024-04-25 DIAGNOSIS — I25.118 CORONARY ARTERY DISEASE OF NATIVE ARTERY OF NATIVE HEART WITH STABLE ANGINA PECTORIS (HCC): Primary | ICD-10-CM

## 2024-04-25 PROCEDURE — 93000 ELECTROCARDIOGRAM COMPLETE: CPT | Performed by: INTERNAL MEDICINE

## 2024-04-25 PROCEDURE — 3078F DIAST BP <80 MM HG: CPT | Performed by: INTERNAL MEDICINE

## 2024-04-25 PROCEDURE — 99214 OFFICE O/P EST MOD 30 MIN: CPT | Performed by: INTERNAL MEDICINE

## 2024-04-25 PROCEDURE — 3075F SYST BP GE 130 - 139MM HG: CPT | Performed by: INTERNAL MEDICINE

## 2024-04-25 RX ORDER — ROSUVASTATIN CALCIUM 20 MG/1
20 TABLET, COATED ORAL DAILY
Qty: 90 TABLET | Refills: 3 | Status: SHIPPED | OUTPATIENT
Start: 2024-04-25

## 2024-04-25 RX ORDER — METOPROLOL SUCCINATE 25 MG/1
25 TABLET, EXTENDED RELEASE ORAL DAILY
Qty: 90 TABLET | Refills: 3 | Status: SHIPPED | OUTPATIENT
Start: 2024-04-25

## 2024-04-25 RX ORDER — NITROGLYCERIN 0.4 MG/1
0.4 TABLET SUBLINGUAL EVERY 5 MIN PRN
Qty: 25 TABLET | Refills: 5 | Status: SHIPPED | OUTPATIENT
Start: 2024-04-25

## 2024-04-25 ASSESSMENT — ENCOUNTER SYMPTOMS: SHORTNESS OF BREATH: 0

## 2024-05-16 ENCOUNTER — TELEPHONE (OUTPATIENT)
Age: 59
End: 2024-05-16

## 2024-05-16 NOTE — TELEPHONE ENCOUNTER
Cardiac Clearance        Physician or Practice Requesting:Gastroenterology  : ?  Contact Phone Number: [phone]  Fax Number: [phone]  Date of Surgery/Procedure: 06/19/24  Type of Surgery or Procedure: EGD/Colon  Type of Anesthesia: ?  Type of Clearance Requested: Cardiac Clearance and Medication Hold  Medication to Hold:Brilinta  Days to Hold: 5 days prior

## 2024-07-22 ENCOUNTER — TELEPHONE (OUTPATIENT)
Dept: INTERNAL MEDICINE CLINIC | Facility: CLINIC | Age: 59
End: 2024-07-22

## 2024-07-23 NOTE — TELEPHONE ENCOUNTER
Patient requests a refill on Nexium and Anoro.  Nexium is managed by ENT.   Anoro was set 2/29/24 for 6 mos supply.    She should have enough Anoro to last until next appt.    Patient has been informed.

## 2024-10-24 ENCOUNTER — OFFICE VISIT (OUTPATIENT)
Dept: INTERNAL MEDICINE CLINIC | Facility: CLINIC | Age: 59
End: 2024-10-24
Payer: COMMERCIAL

## 2024-10-24 VITALS
OXYGEN SATURATION: 96 % | HEART RATE: 75 BPM | TEMPERATURE: 97.4 F | WEIGHT: 139.6 LBS | DIASTOLIC BLOOD PRESSURE: 80 MMHG | SYSTOLIC BLOOD PRESSURE: 132 MMHG | BODY MASS INDEX: 28.14 KG/M2 | HEIGHT: 59 IN

## 2024-10-24 DIAGNOSIS — E78.2 MIXED HYPERLIPIDEMIA: ICD-10-CM

## 2024-10-24 DIAGNOSIS — I70.0 ATHEROSCLEROSIS OF ABDOMINAL AORTA (HCC): ICD-10-CM

## 2024-10-24 DIAGNOSIS — J43.1 PANLOBULAR EMPHYSEMA (HCC): ICD-10-CM

## 2024-10-24 DIAGNOSIS — I25.118 CORONARY ARTERY DISEASE OF NATIVE ARTERY OF NATIVE HEART WITH STABLE ANGINA PECTORIS (HCC): Primary | ICD-10-CM

## 2024-10-24 DIAGNOSIS — I10 ESSENTIAL HYPERTENSION: ICD-10-CM

## 2024-10-24 DIAGNOSIS — I25.118 CORONARY ARTERY DISEASE OF NATIVE ARTERY OF NATIVE HEART WITH STABLE ANGINA PECTORIS (HCC): ICD-10-CM

## 2024-10-24 DIAGNOSIS — R25.1 TREMOR: ICD-10-CM

## 2024-10-24 LAB
ALBUMIN SERPL-MCNC: 3.6 G/DL (ref 3.5–5)
ALBUMIN/GLOB SERPL: 1 (ref 1–1.9)
ALP SERPL-CCNC: 59 U/L (ref 35–104)
ALT SERPL-CCNC: 16 U/L (ref 8–45)
ANION GAP SERPL CALC-SCNC: 10 MMOL/L (ref 9–18)
APPEARANCE UR: CLEAR
AST SERPL-CCNC: 22 U/L (ref 15–37)
BASOPHILS # BLD: 0 K/UL (ref 0–0.2)
BASOPHILS NFR BLD: 1 % (ref 0–2)
BILIRUB DIRECT SERPL-MCNC: <0.2 MG/DL (ref 0–0.4)
BILIRUB SERPL-MCNC: 0.2 MG/DL (ref 0–1.2)
BILIRUB UR QL: NEGATIVE
BUN SERPL-MCNC: 11 MG/DL (ref 6–23)
CALCIUM SERPL-MCNC: 9.3 MG/DL (ref 8.8–10.2)
CHLORIDE SERPL-SCNC: 105 MMOL/L (ref 98–107)
CHOLEST SERPL-MCNC: 230 MG/DL (ref 0–200)
CO2 SERPL-SCNC: 27 MMOL/L (ref 20–28)
COLOR UR: NORMAL
CREAT SERPL-MCNC: 0.77 MG/DL (ref 0.6–1.1)
DIFFERENTIAL METHOD BLD: ABNORMAL
EOSINOPHIL # BLD: 0.1 K/UL (ref 0–0.8)
EOSINOPHIL NFR BLD: 1 % (ref 0.5–7.8)
ERYTHROCYTE [DISTWIDTH] IN BLOOD BY AUTOMATED COUNT: 16.3 % (ref 11.9–14.6)
GLOBULIN SER CALC-MCNC: 3.5 G/DL (ref 2.3–3.5)
GLUCOSE SERPL-MCNC: 92 MG/DL (ref 70–99)
GLUCOSE UR STRIP.AUTO-MCNC: NEGATIVE MG/DL
HCT VFR BLD AUTO: 41.1 % (ref 35.8–46.3)
HDLC SERPL-MCNC: 84 MG/DL (ref 40–60)
HDLC SERPL: 2.7 (ref 0–5)
HGB BLD-MCNC: 12.9 G/DL (ref 11.7–15.4)
HGB UR QL STRIP: NEGATIVE
IMM GRANULOCYTES # BLD AUTO: 0 K/UL (ref 0–0.5)
IMM GRANULOCYTES NFR BLD AUTO: 0 % (ref 0–5)
KETONES UR QL STRIP.AUTO: NEGATIVE MG/DL
LDLC SERPL CALC-MCNC: 135 MG/DL (ref 0–100)
LEUKOCYTE ESTERASE UR QL STRIP.AUTO: NEGATIVE
LYMPHOCYTES # BLD: 3.7 K/UL (ref 0.5–4.6)
LYMPHOCYTES NFR BLD: 57 % (ref 13–44)
MCH RBC QN AUTO: 22.8 PG (ref 26.1–32.9)
MCHC RBC AUTO-ENTMCNC: 31.4 G/DL (ref 31.4–35)
MCV RBC AUTO: 72.6 FL (ref 82–102)
MONOCYTES # BLD: 0.4 K/UL (ref 0.1–1.3)
MONOCYTES NFR BLD: 6 % (ref 4–12)
NEUTS SEG # BLD: 2.2 K/UL (ref 1.7–8.2)
NEUTS SEG NFR BLD: 35 % (ref 43–78)
NITRITE UR QL STRIP.AUTO: NEGATIVE
NRBC # BLD: 0 K/UL (ref 0–0.2)
PH UR STRIP: 6.5 (ref 5–9)
PLATELET # BLD AUTO: 197 K/UL (ref 150–450)
PMV BLD AUTO: 11.6 FL (ref 9.4–12.3)
POTASSIUM SERPL-SCNC: 3.9 MMOL/L (ref 3.5–5.1)
PROT SERPL-MCNC: 7 G/DL (ref 6.3–8.2)
PROT UR STRIP-MCNC: NEGATIVE MG/DL
RBC # BLD AUTO: 5.66 M/UL (ref 4.05–5.2)
SODIUM SERPL-SCNC: 142 MMOL/L (ref 136–145)
SP GR UR REFRACTOMETRY: 1.02 (ref 1–1.02)
TRIGL SERPL-MCNC: 55 MG/DL (ref 0–150)
TSH, 3RD GENERATION: 2 UIU/ML (ref 0.27–4.2)
UROBILINOGEN UR QL STRIP.AUTO: 1 EU/DL (ref 0.2–1)
VLDLC SERPL CALC-MCNC: 11 MG/DL (ref 6–23)
WBC # BLD AUTO: 6.4 K/UL (ref 4.3–11.1)

## 2024-10-24 PROCEDURE — 3075F SYST BP GE 130 - 139MM HG: CPT | Performed by: INTERNAL MEDICINE

## 2024-10-24 PROCEDURE — 99214 OFFICE O/P EST MOD 30 MIN: CPT | Performed by: INTERNAL MEDICINE

## 2024-10-24 PROCEDURE — 3079F DIAST BP 80-89 MM HG: CPT | Performed by: INTERNAL MEDICINE

## 2024-10-24 RX ORDER — ALBUTEROL SULFATE 90 UG/1
2 INHALANT RESPIRATORY (INHALATION) 4 TIMES DAILY PRN
Qty: 18 G | Refills: 5 | Status: SHIPPED | OUTPATIENT
Start: 2024-10-24

## 2024-10-24 RX ORDER — UMECLIDINIUM BROMIDE AND VILANTEROL TRIFENATATE 62.5; 25 UG/1; UG/1
1 POWDER RESPIRATORY (INHALATION) DAILY
Qty: 30 EACH | Refills: 5 | Status: SHIPPED | OUTPATIENT
Start: 2024-10-24

## 2024-10-24 ASSESSMENT — PATIENT HEALTH QUESTIONNAIRE - PHQ9
1. LITTLE INTEREST OR PLEASURE IN DOING THINGS: NOT AT ALL
SUM OF ALL RESPONSES TO PHQ QUESTIONS 1-9: 0
SUM OF ALL RESPONSES TO PHQ9 QUESTIONS 1 & 2: 0
2. FEELING DOWN, DEPRESSED OR HOPELESS: NOT AT ALL

## 2024-10-24 NOTE — PROGRESS NOTES
erythema.   Neurological:      General: No focal deficit present.      Mental Status: She is alert and oriented to person, place, and time. Mental status is at baseline.   Psychiatric:         Mood and Affect: Mood normal.         Behavior: Behavior normal.         Thought Content: Thought content normal.         Judgment: Judgment normal.       ASSESSMENT AND PLAN:    CAD: KELLY to LAD 2/19/21.  Follows with Cardiology (Dr. Pena).  On Asa/Brilinta.  On Ranexa for chronic stable angina.  Risk factors medically modified per below.  Taking meds w/o probs.  Asymptomatic w/o angina or STRATTON.  Well controlled.  Continue risk factor modifications per below.  Follow up with Dr. Pena (Also with aortic calcifications).   HTN: Non-compliant with current regimen (Toprol XL 25).  Home BPs = Not checking.  Recommend compliance with current regimen to lower risk for morbidity/mortality.  Asked to monitor BP at home, call me if it runs above 140/80, and bring in a log next time.   HLD: Non-compliant with Statin Tx.  Recommend compliance with Statin Tx to lower risk for morbidity/mortality, e.g. dying on an MI or stroke.   FLPs:  3/15/18 Untreated = [217/119/87/53].  8/29/23 on Crestor 20 = [172/60.8/103/41].   10/24/24 on Crestor 20 = [  COPD: PFTs 2/9/22 = Severe COPD.  Taking current regimen (Anoro with PRN Alb) without problems and with good control of symptoms.  Well controlled.  Continue current regimen.   HCM:   [place]: 6/20/24 by Dr. Ferraro = IH; 1 Yr (Poor prep).   Mammogram: Follows with Gyn.  4/21/22 = Neg.  Non-compliant with yearly Mammo which I recommend to lower risk for morbidity/mortality.   Pap Smear: Follows with Gyn (Dr. Echeverria).  BMD: Follows with Gyn.  Flu: Recommend yearly flu vaccine to lower risk for morbidity/mortality.   Covid: Recommend staying UTD on Covid vaccinations/boosters.   F/u: 6 Mos w/ fasting labs.     Arlette was seen today for coronary artery disease.    Diagnoses and all orders for this

## 2024-10-24 NOTE — ACP (ADVANCE CARE PLANNING)
Advance Care Planning   The patient has the following advanced directives on file:  Advance Directives       Power of  Living Will ACP-Advance Directive ACP-Power of     Not on File Not on File Not on File Not on File            The patient has appointed the following active healthcare agents:    Primary Decision Maker: Guy Kirkland - [phone]    The Patient has the following current code status:    Code Status: Not on file    Visit Documentation:  I discussed Advance Care Planning with Arlette MARCELL Perez today which included the importance of making their choices for care and treatment in the case of a health event that adversely affects their decision-making abilities. She has not completed the Advance Care Directives. She does not have an active health care agent at this time.  Arlette MARCELL Peerz was encouraged to complete the declaration forms and provide a signed copy of her medical records.  I advised patient we would continue this discussion at future visits.     Misha Felix  10/24/2024

## 2024-12-02 ENCOUNTER — HOSPITAL ENCOUNTER (EMERGENCY)
Age: 59
Discharge: HOME | End: 2024-12-03
Attending: STUDENT IN AN ORGANIZED HEALTH CARE EDUCATION/TRAINING PROGRAM
Payer: COMMERCIAL

## 2024-12-02 ENCOUNTER — APPOINTMENT (OUTPATIENT)
Dept: GENERAL RADIOLOGY | Age: 59
End: 2024-12-02
Payer: COMMERCIAL

## 2024-12-02 DIAGNOSIS — R07.9 CHEST PAIN, UNSPECIFIED TYPE: Primary | ICD-10-CM

## 2024-12-02 LAB
ALBUMIN SERPL-MCNC: 3.2 G/DL (ref 3.5–5)
ALBUMIN/GLOB SERPL: 1.1 (ref 1–1.9)
ALP SERPL-CCNC: 69 U/L (ref 35–104)
ALT SERPL-CCNC: 17 U/L (ref 8–45)
ANION GAP SERPL CALC-SCNC: 9 MMOL/L (ref 7–16)
AST SERPL-CCNC: 19 U/L (ref 15–37)
BASOPHILS # BLD: 0 K/UL (ref 0–0.2)
BASOPHILS NFR BLD: 0 % (ref 0–2)
BILIRUB SERPL-MCNC: <0.2 MG/DL (ref 0–1.2)
BUN SERPL-MCNC: 11 MG/DL (ref 6–23)
CALCIUM SERPL-MCNC: 8.9 MG/DL (ref 8.8–10.2)
CHLORIDE SERPL-SCNC: 106 MMOL/L (ref 98–107)
CO2 SERPL-SCNC: 25 MMOL/L (ref 20–29)
CREAT SERPL-MCNC: 0.84 MG/DL (ref 0.6–1.1)
DIFFERENTIAL METHOD BLD: ABNORMAL
EOSINOPHIL # BLD: 0.1 K/UL (ref 0–0.8)
EOSINOPHIL NFR BLD: 2 % (ref 0.5–7.8)
ERYTHROCYTE [DISTWIDTH] IN BLOOD BY AUTOMATED COUNT: 15.6 % (ref 11.9–14.6)
GLOBULIN SER CALC-MCNC: 2.8 G/DL (ref 2.3–3.5)
GLUCOSE SERPL-MCNC: 119 MG/DL (ref 70–99)
HCT VFR BLD AUTO: 40.4 % (ref 35.8–46.3)
HGB BLD-MCNC: 12.9 G/DL (ref 11.7–15.4)
IMM GRANULOCYTES # BLD AUTO: 0 K/UL (ref 0–0.5)
IMM GRANULOCYTES NFR BLD AUTO: 0 % (ref 0–5)
LYMPHOCYTES # BLD: 2.5 K/UL (ref 0.5–4.6)
LYMPHOCYTES NFR BLD: 49 % (ref 13–44)
MCH RBC QN AUTO: 22.6 PG (ref 26.1–32.9)
MCHC RBC AUTO-ENTMCNC: 31.9 G/DL (ref 31.4–35)
MCV RBC AUTO: 70.8 FL (ref 82–102)
MONOCYTES # BLD: 0.6 K/UL (ref 0.1–1.3)
MONOCYTES NFR BLD: 11 % (ref 4–12)
NEUTS SEG # BLD: 1.9 K/UL (ref 1.7–8.2)
NEUTS SEG NFR BLD: 38 % (ref 43–78)
NRBC # BLD: 0 K/UL (ref 0–0.2)
PLATELET # BLD AUTO: 193 K/UL (ref 150–450)
PMV BLD AUTO: 10.1 FL (ref 9.4–12.3)
POTASSIUM SERPL-SCNC: 4 MMOL/L (ref 3.5–5.1)
PROT SERPL-MCNC: 6 G/DL (ref 6.3–8.2)
RBC # BLD AUTO: 5.71 M/UL (ref 4.05–5.2)
SODIUM SERPL-SCNC: 140 MMOL/L (ref 136–145)
TROPONIN T SERPL HS-MCNC: <6 NG/L (ref 0–14)
TROPONIN T SERPL HS-MCNC: <6 NG/L (ref 0–14)
WBC # BLD AUTO: 5.1 K/UL (ref 4.3–11.1)

## 2024-12-02 PROCEDURE — 99285 EMERGENCY DEPT VISIT HI MDM: CPT

## 2024-12-02 PROCEDURE — 85025 COMPLETE CBC W/AUTO DIFF WBC: CPT

## 2024-12-02 PROCEDURE — 71046 X-RAY EXAM CHEST 2 VIEWS: CPT

## 2024-12-02 PROCEDURE — 80053 COMPREHEN METABOLIC PANEL: CPT

## 2024-12-02 PROCEDURE — 93005 ELECTROCARDIOGRAM TRACING: CPT | Performed by: STUDENT IN AN ORGANIZED HEALTH CARE EDUCATION/TRAINING PROGRAM

## 2024-12-02 PROCEDURE — 84484 ASSAY OF TROPONIN QUANT: CPT

## 2024-12-02 ASSESSMENT — LIFESTYLE VARIABLES
HOW OFTEN DO YOU HAVE A DRINK CONTAINING ALCOHOL: NEVER
HOW MANY STANDARD DRINKS CONTAINING ALCOHOL DO YOU HAVE ON A TYPICAL DAY: PATIENT DOES NOT DRINK

## 2024-12-02 ASSESSMENT — PAIN SCALES - GENERAL: PAINLEVEL_OUTOF10: 5

## 2024-12-02 ASSESSMENT — PAIN - FUNCTIONAL ASSESSMENT: PAIN_FUNCTIONAL_ASSESSMENT: 0-10

## 2024-12-03 VITALS
TEMPERATURE: 98.6 F | SYSTOLIC BLOOD PRESSURE: 135 MMHG | BODY MASS INDEX: 26.61 KG/M2 | HEIGHT: 59 IN | HEART RATE: 68 BPM | OXYGEN SATURATION: 94 % | DIASTOLIC BLOOD PRESSURE: 81 MMHG | WEIGHT: 132 LBS | RESPIRATION RATE: 19 BRPM

## 2024-12-03 LAB
EKG ATRIAL RATE: 79 BPM
EKG DIAGNOSIS: NORMAL
EKG P AXIS: 78 DEGREES
EKG P-R INTERVAL: 174 MS
EKG Q-T INTERVAL: 386 MS
EKG QRS DURATION: 74 MS
EKG QTC CALCULATION (BAZETT): 479 MS
EKG R AXIS: 76 DEGREES
EKG T AXIS: 82 DEGREES
EKG VENTRICULAR RATE: 93 BPM

## 2024-12-03 PROCEDURE — 93010 ELECTROCARDIOGRAM REPORT: CPT | Performed by: INTERNAL MEDICINE

## 2024-12-03 ASSESSMENT — PAIN SCALES - GENERAL: PAINLEVEL_OUTOF10: 0

## 2024-12-03 ASSESSMENT — PAIN - FUNCTIONAL ASSESSMENT: PAIN_FUNCTIONAL_ASSESSMENT: NONE - DENIES PAIN

## 2024-12-03 NOTE — ED PROVIDER NOTES
Emergency Department Provider Note       PCP: Miguel Ledesma MD   Age: 59 y.o.   Sex: female     DISPOSITION Decision To Discharge 12/02/2024 11:05:54 PM    ICD-10-CM    1. Chest pain, unspecified type  R07.9           Medical Decision Making     59-year-old female presents emergency department complaint of right-sided chest discomfort.  States she it began approximately 1 hour prior to arrival.  States she is unable to describe the pain but was located in the right side of her chest and radiated under her right breast and to the lateral aspect of her right ribs.  No improving or worsening factors.  States she drank some ginger ale as she thought it was related to her acid reflux.  States the pain did improve since its onset and has nearly completely resolved at this time.  Reports nausea but no vomiting earlier.  Denies shortness of breath.  Denies pain or swelling in lower extremities.  Is compliant with all of her medications.  Is established with cardiology and does have history of CAD with prior stenting.  Has appointment to see cardiology in approximately 2 weeks.  Will obtain a broad-based workup.  EKG shows no evidence of acute ischemia.  CBC is normal.  Chest x-ray is normal.  Troponin and CMP are normal.  Patient again with completely resolved symptoms.  Repeat troponin is pending.  As long as repeat troponin is normal that she will be discharged home with outpatient cardiology follow-up and return precautions.  She voiced understanding and agreement.     Complexity of Problem: 1 acute complaint requiring workup rule out emergent etiology  Shared medical decision making was utilized in creating the patients health plan today.  I independently ordered and reviewed each unique test.    I reviewed external records: provider visit note from PCP.  I reviewed external records: provider visit note from outside specialist.     ED cardiac monitoring rhythm strip was ordered and interpreted:  sinus rhythm, no

## 2024-12-03 NOTE — DISCHARGE INSTRUCTIONS
Continue to monitor your symptoms at home.  Follow-up with cardiology later this month as previously scheduled.  Return to the ER for worsening or worrisome symptoms.

## 2024-12-04 ENCOUNTER — CARE COORDINATION (OUTPATIENT)
Dept: CARE COORDINATION | Facility: CLINIC | Age: 59
End: 2024-12-04

## 2024-12-04 NOTE — CARE COORDINATION
Ambulatory Care Management Outreach Attempt    This patient was received as a referral from  Daily assignment for case management of ed utilizer.    Attempted to reach patient for ED follow up. Pt has respectfully declined services at this time, my contact information has been shared with pt in the event there circumstances change. They are free to reach out at any time. ACM signing off.        Patient: Arlette Perez Patient : 1965 MRN: [id number]    Last Discharge Facility       Date Complaint Diagnosis Description Type Department Provider    24 chest pain Chest pain, unspecified type ED (DISCHARGE) [facility],                 Noted following upcoming appointments from discharge chart review:   [facility] follow up appointment(s):   Future Appointments   Date Time Provider Department Center   2024  8:45 AM Karen Sanchez MD [facility] AMB   2025  5:45 PM [facility]   2025  8:45 AM Shabbir Echeverria MD [facility] AMB   2025  3:40 PM Miguel Ledesma MD [facility] DEP   2025  9:00 AM Endy Daniels MD [facility]     Non-[facility] follow up appointment(s):

## 2024-12-18 NOTE — PROGRESS NOTES
[facility] CARDIOLOGY  [address] [place] [address], [address]  [place] [address]  PHONE: [phone]      24    NAME:  Arlette Perez  : 1965  MRN: [id number]         SUBJECTIVE:   Arlette Perez is a 59 y.o. female seen for a follow up visit regarding the following:     Chief Complaint   Patient presents with    Follow-up    Coronary Artery Disease            HPI:  Follow up  Follow-up and Coronary Artery Disease   .    Follow up distal LAD stent, diffuse disease elsewhere of moderate severity.  Multiple types of chest discomfort  which previously did not respond to several antianginal meds, prior tobacco use/reformed smoker.  ER visit 24 with chest pain that resolved after about an hour and some ginger ale, workup negative.      She retired from [employer], couldn't stand it, went back to work as  for [employer].  She's had no further chest discomfort since that ER visit and is sure it was reflux.  She will occasionally bum a cigarette off someone but she is no longer habitually smoking.      Her LDL was up, she admits she was missing her rosuvastatin fairly frequently.  She states she and Dr Ledesma already discussed it and she's been much more faithful, with follow up labs and visit with him in April.         Past cardiac history:   2020- Coronary calcium 1204, diffuse   2021- normal vasodilator perfusion study and EF, however patient was unable to walk beyond stage II, was converted to vasodilator protocol, and belatedly mentioned chest discomfort prior to the conversion.  PVC's were noted with exercise and recovery  with occasional ventricular couplet   2021- 2.5 x 26 Delta to distal LAD.  Several small diagonals with diffuse disease but sub-2 mm vessels, moderate diffuse Cx and RCA disease              Cardiac Medications       Nitrates       nitroGLYCERIN (NITROSTAT) 0.4 MG SL tablet Place 1 tablet under the tongue every 5 minutes as needed for Chest pain       Beta

## 2024-12-19 ENCOUNTER — OFFICE VISIT (OUTPATIENT)
Age: 59
End: 2024-12-19
Payer: COMMERCIAL

## 2024-12-19 VITALS
DIASTOLIC BLOOD PRESSURE: 80 MMHG | WEIGHT: 141 LBS | SYSTOLIC BLOOD PRESSURE: 130 MMHG | HEART RATE: 74 BPM | BODY MASS INDEX: 28.43 KG/M2 | HEIGHT: 59 IN

## 2024-12-19 DIAGNOSIS — I10 ESSENTIAL HYPERTENSION: ICD-10-CM

## 2024-12-19 DIAGNOSIS — E78.2 MIXED HYPERLIPIDEMIA: ICD-10-CM

## 2024-12-19 DIAGNOSIS — I25.118 CORONARY ARTERY DISEASE OF NATIVE ARTERY OF NATIVE HEART WITH STABLE ANGINA PECTORIS (HCC): Primary | ICD-10-CM

## 2024-12-19 PROCEDURE — 3079F DIAST BP 80-89 MM HG: CPT | Performed by: INTERNAL MEDICINE

## 2024-12-19 PROCEDURE — 99214 OFFICE O/P EST MOD 30 MIN: CPT | Performed by: INTERNAL MEDICINE

## 2024-12-19 PROCEDURE — 3075F SYST BP GE 130 - 139MM HG: CPT | Performed by: INTERNAL MEDICINE

## 2024-12-19 ASSESSMENT — ENCOUNTER SYMPTOMS: SHORTNESS OF BREATH: 0

## 2025-01-16 ENCOUNTER — HOSPITAL ENCOUNTER (OUTPATIENT)
Dept: MAMMOGRAPHY | Age: 60
Discharge: HOME | End: 2025-01-19
Attending: OBSTETRICS & GYNECOLOGY
Payer: COMMERCIAL

## 2025-01-16 DIAGNOSIS — Z12.31 ENCOUNTER FOR SCREENING MAMMOGRAM FOR MALIGNANT NEOPLASM OF BREAST: ICD-10-CM

## 2025-01-16 PROCEDURE — 77063 BREAST TOMOSYNTHESIS BI: CPT

## 2025-02-10 ENCOUNTER — OFFICE VISIT (OUTPATIENT)
Dept: INTERNAL MEDICINE CLINIC | Facility: CLINIC | Age: 60
End: 2025-02-10
Payer: COMMERCIAL

## 2025-02-10 VITALS
DIASTOLIC BLOOD PRESSURE: 70 MMHG | WEIGHT: 143.25 LBS | SYSTOLIC BLOOD PRESSURE: 126 MMHG | TEMPERATURE: 98 F | BODY MASS INDEX: 28.88 KG/M2 | HEIGHT: 59 IN | HEART RATE: 53 BPM | OXYGEN SATURATION: 95 %

## 2025-02-10 DIAGNOSIS — R11.2 NAUSEA AND VOMITING, UNSPECIFIED VOMITING TYPE: ICD-10-CM

## 2025-02-10 DIAGNOSIS — R11.2 NAUSEA AND VOMITING, UNSPECIFIED VOMITING TYPE: Primary | ICD-10-CM

## 2025-02-10 LAB
ALBUMIN SERPL-MCNC: 3.4 G/DL (ref 3.2–4.6)
ALBUMIN/GLOB SERPL: 1 (ref 1–1.9)
ALP SERPL-CCNC: 65 U/L (ref 35–104)
ALT SERPL-CCNC: 13 U/L (ref 8–45)
ANION GAP SERPL CALC-SCNC: 11 MMOL/L (ref 7–16)
APPEARANCE UR: CLEAR
AST SERPL-CCNC: 22 U/L (ref 15–37)
BACTERIA URNS QL MICRO: ABNORMAL /HPF
BASOPHILS # BLD: 0.04 K/UL (ref 0–0.2)
BASOPHILS NFR BLD: 0.6 % (ref 0–2)
BILIRUB DIRECT SERPL-MCNC: <0.2 MG/DL (ref 0–0.4)
BILIRUB SERPL-MCNC: <0.2 MG/DL (ref 0–1.2)
BILIRUB UR QL: NEGATIVE
BUN SERPL-MCNC: 24 MG/DL (ref 8–23)
CALCIUM SERPL-MCNC: 9.1 MG/DL (ref 8.8–10.2)
CHLORIDE SERPL-SCNC: 109 MMOL/L (ref 98–107)
CO2 SERPL-SCNC: 23 MMOL/L (ref 20–29)
COLOR UR: ABNORMAL
CREAT SERPL-MCNC: 0.7 MG/DL (ref 0.6–1.1)
DIFFERENTIAL METHOD BLD: ABNORMAL
EOSINOPHIL # BLD: 0.1 K/UL (ref 0–0.8)
EOSINOPHIL NFR BLD: 1.5 % (ref 0.5–7.8)
EPI CELLS #/AREA URNS HPF: ABNORMAL /HPF
ERYTHROCYTE [DISTWIDTH] IN BLOOD BY AUTOMATED COUNT: 16.8 % (ref 11.9–14.6)
GLOBULIN SER CALC-MCNC: 3.4 G/DL (ref 2.3–3.5)
GLUCOSE SERPL-MCNC: 96 MG/DL (ref 70–99)
GLUCOSE UR STRIP.AUTO-MCNC: NEGATIVE MG/DL
HCT VFR BLD AUTO: 38.5 % (ref 35.8–46.3)
HGB BLD-MCNC: 12.8 G/DL (ref 11.7–15.4)
HGB UR QL STRIP: NEGATIVE
IMM GRANULOCYTES # BLD AUTO: 0.02 K/UL (ref 0–0.5)
IMM GRANULOCYTES NFR BLD AUTO: 0.3 % (ref 0–5)
KETONES UR QL STRIP.AUTO: NEGATIVE MG/DL
LEUKOCYTE ESTERASE UR QL STRIP.AUTO: NEGATIVE
LIPASE SERPL-CCNC: 23 U/L (ref 13–60)
LYMPHOCYTES # BLD: 3.14 K/UL (ref 0.5–4.6)
LYMPHOCYTES NFR BLD: 46.9 % (ref 13–44)
MCH RBC QN AUTO: 23.3 PG (ref 26.1–32.9)
MCHC RBC AUTO-ENTMCNC: 33.2 G/DL (ref 31.4–35)
MCV RBC AUTO: 70.1 FL (ref 82–102)
MONOCYTES # BLD: 0.39 K/UL (ref 0.1–1.3)
MONOCYTES NFR BLD: 5.8 % (ref 4–12)
NEUTS SEG # BLD: 3 K/UL (ref 1.7–8.2)
NEUTS SEG NFR BLD: 44.9 % (ref 43–78)
NITRITE UR QL STRIP.AUTO: POSITIVE
NRBC # BLD: 0 K/UL (ref 0–0.2)
OTHER OBSERVATIONS: ABNORMAL
PH UR STRIP: 6.5 (ref 5–9)
PLATELET # BLD AUTO: 209 K/UL (ref 150–450)
PMV BLD AUTO: 11.6 FL (ref 9.4–12.3)
POTASSIUM SERPL-SCNC: 3.9 MMOL/L (ref 3.5–5.1)
PROT SERPL-MCNC: 6.8 G/DL (ref 6.3–8.2)
PROT UR STRIP-MCNC: NEGATIVE MG/DL
RBC # BLD AUTO: 5.49 M/UL (ref 4.05–5.2)
SODIUM SERPL-SCNC: 142 MMOL/L (ref 136–145)
SP GR UR REFRACTOMETRY: 1.02 (ref 1–1.02)
UROBILINOGEN UR QL STRIP.AUTO: 1 EU/DL (ref 0.2–1)
WBC # BLD AUTO: 6.7 K/UL (ref 4.3–11.1)
WBC URNS QL MICRO: ABNORMAL /HPF

## 2025-02-10 PROCEDURE — 3078F DIAST BP <80 MM HG: CPT | Performed by: INTERNAL MEDICINE

## 2025-02-10 PROCEDURE — 99214 OFFICE O/P EST MOD 30 MIN: CPT | Performed by: INTERNAL MEDICINE

## 2025-02-10 PROCEDURE — 3074F SYST BP LT 130 MM HG: CPT | Performed by: INTERNAL MEDICINE

## 2025-02-10 RX ORDER — ONDANSETRON 4 MG/1
4 TABLET, ORALLY DISINTEGRATING ORAL 3 TIMES DAILY PRN
Qty: 21 TABLET | Refills: 0 | Status: SHIPPED | OUTPATIENT
Start: 2025-02-10

## 2025-02-10 RX ORDER — ONDANSETRON 4 MG/1
4 TABLET, ORALLY DISINTEGRATING ORAL 3 TIMES DAILY PRN
Qty: 21 TABLET | Refills: 0 | Status: SHIPPED | OUTPATIENT
Start: 2025-02-10 | End: 2025-02-10

## 2025-02-10 SDOH — ECONOMIC STABILITY: FOOD INSECURITY: WITHIN THE PAST 12 MONTHS, YOU WORRIED THAT YOUR FOOD WOULD RUN OUT BEFORE YOU GOT MONEY TO BUY MORE.: NEVER TRUE

## 2025-02-10 SDOH — ECONOMIC STABILITY: FOOD INSECURITY: WITHIN THE PAST 12 MONTHS, THE FOOD YOU BOUGHT JUST DIDN'T LAST AND YOU DIDN'T HAVE MONEY TO GET MORE.: NEVER TRUE

## 2025-02-10 ASSESSMENT — PATIENT HEALTH QUESTIONNAIRE - PHQ9
SUM OF ALL RESPONSES TO PHQ QUESTIONS 1-9: 0
SUM OF ALL RESPONSES TO PHQ9 QUESTIONS 1 & 2: 0
2. FEELING DOWN, DEPRESSED OR HOPELESS: NOT AT ALL
SUM OF ALL RESPONSES TO PHQ QUESTIONS 1-9: 0
1. LITTLE INTEREST OR PLEASURE IN DOING THINGS: NOT AT ALL

## 2025-02-10 ASSESSMENT — ENCOUNTER SYMPTOMS
TROUBLE SWALLOWING: 0
DIARRHEA: 0
VOMITING: 1
ABDOMINAL PAIN: 0
BLOOD IN STOOL: 0
CONSTIPATION: 0

## 2025-02-10 NOTE — PROGRESS NOTES
Miguel Ledesma M.D[place]  [place] [address]  Phone: [phone]  Fax: [phone]    Vomiting   This is a new problem. The current episode started 1 to 4 weeks ago (2 weeks ago.). Episode frequency: Threw up twice two weeks ago. The problem has been resolved. The emesis has an appearance of stomach contents. There has been no fever. Pertinent negatives include no abdominal pain, chills, diarrhea or fever. 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 She has tried nothing for the symptoms.      Arlette Perez is a 60 y.o. Black /  female.     Two weeks ago had URI symptoms, nausea, vomitted twice, felt anxious, general \"all over\" bad feeling.  Went away w/I a week and now feels back to normal.     Current Outpatient Medications   Medication Sig Dispense Refill    albuterol sulfate HFA (VENTOLIN HFA) 108 (90 Base) MCG/ACT inhaler Inhale 2 puffs into the lungs 4 times daily as needed for Wheezing 18 g 5    umeclidinium-vilanterol (ANORO ELLIPTA) 62.5-25 MCG/ACT inhaler Inhale 1 puff into the lungs daily 30 each 5    metoprolol succinate (TOPROL XL) 25 MG extended release tablet Take 1 tablet by mouth daily 90 tablet 3    nitroGLYCERIN (NITROSTAT) 0.4 MG SL tablet Place 1 tablet under the tongue every 5 minutes as needed for Chest pain 25 tablet 5    rosuvastatin (CRESTOR) 20 MG tablet Take 1 tablet by mouth daily 90 tablet 3    ticagrelor (BRILINTA) 90 MG TABS tablet Take 1 tablet by mouth 2 times daily 180 tablet 3    esomeprazole (NEXIUM) 40 MG delayed release capsule Take 1 capsule by mouth every morning (before breakfast) 30 capsule 5    aspirin 81 MG EC tablet Take 1 tablet by mouth daily       No current facility-administered medications for this visit.     No Known Allergies  Past Medical History:   Diagnosis Date    Anemia     not currently taking iron supplements; had hysterectomy    Benign essential tremor 03/15/2018    CAD 
0

## 2025-04-07 ENCOUNTER — OFFICE VISIT (OUTPATIENT)
Dept: OBGYN CLINIC | Age: 60
End: 2025-04-07
Payer: COMMERCIAL

## 2025-04-07 VITALS
HEIGHT: 59 IN | SYSTOLIC BLOOD PRESSURE: 124 MMHG | WEIGHT: 146 LBS | DIASTOLIC BLOOD PRESSURE: 70 MMHG | BODY MASS INDEX: 29.43 KG/M2

## 2025-04-07 DIAGNOSIS — Z12.31 ENCOUNTER FOR SCREENING MAMMOGRAM FOR MALIGNANT NEOPLASM OF BREAST: ICD-10-CM

## 2025-04-07 DIAGNOSIS — R82.90 MALODOROUS URINE: ICD-10-CM

## 2025-04-07 DIAGNOSIS — Z01.419 WELL WOMAN EXAM WITH ROUTINE GYNECOLOGICAL EXAM: Primary | ICD-10-CM

## 2025-04-07 LAB
AMORPHOUS CRYSTAL: NORMAL
BACTERIA URINE, POC: NORMAL
BILIRUBIN, URINE, POC: NEGATIVE
BLOOD URINE, POC: NEGATIVE
CASTS URINE, POC: NORMAL
CRYSTALS UA, POC: NORMAL
EPI CELLS URINE, POC: NORMAL
GLUCOSE URINE, POC: NEGATIVE
KETONES, URINE, POC: NEGATIVE
LEUKOCYTE ESTERASE, URINE, POC: NEGATIVE
NITRITE, URINE, POC: NORMAL
OTHER, URINE: NORMAL
PH, URINE, POC: 6 (ref 4.6–8)
PROTEIN,URINE, POC: NEGATIVE
RBC, URINE, POC: NORMAL
SPECIFIC GRAVITY, URINE, POC: 1.01 (ref 1–1.03)
URINALYSIS CLARITY, POC: NORMAL
URINALYSIS COLOR, POC: YELLOW
UROBILINOGEN, POC: NORMAL MG/DL
WBC, URINE, POC: NORMAL

## 2025-04-07 PROCEDURE — 3074F SYST BP LT 130 MM HG: CPT | Performed by: OBSTETRICS & GYNECOLOGY

## 2025-04-07 PROCEDURE — 3078F DIAST BP <80 MM HG: CPT | Performed by: OBSTETRICS & GYNECOLOGY

## 2025-04-07 PROCEDURE — 81000 URINALYSIS NONAUTO W/SCOPE: CPT | Performed by: OBSTETRICS & GYNECOLOGY

## 2025-04-07 PROCEDURE — 99396 PREV VISIT EST AGE 40-64: CPT | Performed by: OBSTETRICS & GYNECOLOGY

## 2025-04-07 PROCEDURE — 99459 PELVIC EXAMINATION: CPT | Performed by: OBSTETRICS & GYNECOLOGY

## 2025-04-07 ASSESSMENT — ENCOUNTER SYMPTOMS
EYES NEGATIVE: 1
RESPIRATORY NEGATIVE: 1
BLOOD IN STOOL: 0
ABDOMINAL PAIN: 0
COUGH: 0
SHORTNESS OF BREATH: 0
ALLERGIC/IMMUNOLOGIC NEGATIVE: 1
GASTROINTESTINAL NEGATIVE: 1

## 2025-04-07 NOTE — PROGRESS NOTES
Arlette Perez is 60 y.o. female, , who presents today for a routine annual gynecological examination.No LMP recorded. Patient has had a hysterectomy. . Doing well.  No Gyn, Breast, G/U, or GI complaints.  Just has had some malodorous urine lately.  No other UTI sx, no hematuria.  Denies supplements.      Date of last Mammogram: 2025   - result: Normal  Date of last Cervical Cancer screen (HPV or PAP): 3/11/2021         2024     9:00 AM 4/3/2023     9:00 AM   Mammogram/Pap Hx   Mammogram Date 2022   Mammogram Result neg negative   Pap Date 3/11/2021 3/11/2021   Pap Result wnl negative         2025     8:00 AM 2024     9:00 AM   GYN Intake Questionnaire   Current Form of Contraception Hysterectomy Hysterectomy   Dyspareunia  None   Post-Coital Bleeding  None   Date of Mammogram  2022   Result of Mammogram  neg   Date of Pap  3/11/2021   Pap result  wnl       Contraception:  hysterectomy  Has received Gardisil: NO  Last [place] : Date of last Colonoscopy: 2024, repeat   No cologuard on file  No FIT/FOBT on file   No flexible sigmoidoscopy on file  Last time cholesterol was checked: 1 years ago    OB History:   OB History    Para Term  AB Living   3 3 3      SAB IAB Ectopic Molar Multiple Live Births              # Outcome Date GA Lbr Jm/2nd Weight Sex Type Anes PTL Lv   3 Term    2.863 kg (6 lb 5 oz) M       2 Term            1 Term    2.438 kg (5 lb 6 oz) M             Health Maintenance Due   Topic Date Due    Pneumococcal 50+ years Vaccine (1 of 2 - PCV) Never done    Lung Cancer Screening &/or Counseling  2022    COVID-19 Vaccine ( season) 2024    Respiratory Syncytial Virus (RSV) Pregnant or age 60 yrs+ (1 - Risk 60-74 years 1-dose series) Never done         GYN History            Arlette  has a past medical history of Anemia, Benign essential tremor, CAD (coronary artery disease), GERD (gastroesophageal reflux

## 2025-04-08 ENCOUNTER — RESULTS FOLLOW-UP (OUTPATIENT)
Dept: OBGYN CLINIC | Age: 60
End: 2025-04-08

## 2025-04-09 LAB
BACTERIA SPEC CULT: NORMAL
SERVICE CMNT-IMP: NORMAL

## 2025-04-24 ENCOUNTER — OFFICE VISIT (OUTPATIENT)
Dept: INTERNAL MEDICINE CLINIC | Facility: CLINIC | Age: 60
End: 2025-04-24
Payer: COMMERCIAL

## 2025-04-24 VITALS
HEART RATE: 70 BPM | DIASTOLIC BLOOD PRESSURE: 74 MMHG | WEIGHT: 147 LBS | RESPIRATION RATE: 16 BRPM | TEMPERATURE: 98 F | OXYGEN SATURATION: 96 % | BODY MASS INDEX: 29.64 KG/M2 | SYSTOLIC BLOOD PRESSURE: 132 MMHG | HEIGHT: 59 IN

## 2025-04-24 DIAGNOSIS — J43.1 PANLOBULAR EMPHYSEMA (HCC): ICD-10-CM

## 2025-04-24 DIAGNOSIS — J44.9 STAGE 3 SEVERE COPD BY GOLD CLASSIFICATION (HCC): ICD-10-CM

## 2025-04-24 DIAGNOSIS — I25.118 CORONARY ARTERY DISEASE OF NATIVE ARTERY OF NATIVE HEART WITH STABLE ANGINA PECTORIS: Primary | ICD-10-CM

## 2025-04-24 DIAGNOSIS — I10 ESSENTIAL HYPERTENSION: ICD-10-CM

## 2025-04-24 DIAGNOSIS — I25.118 CORONARY ARTERY DISEASE OF NATIVE ARTERY OF NATIVE HEART WITH STABLE ANGINA PECTORIS: ICD-10-CM

## 2025-04-24 DIAGNOSIS — E78.2 MIXED HYPERLIPIDEMIA: ICD-10-CM

## 2025-04-24 DIAGNOSIS — Z87.891 PERSONAL HISTORY OF TOBACCO USE: ICD-10-CM

## 2025-04-24 LAB
ALBUMIN SERPL-MCNC: 3.6 G/DL (ref 3.2–4.6)
ALBUMIN/GLOB SERPL: 1.1 (ref 1–1.9)
ALP SERPL-CCNC: 59 U/L (ref 35–104)
ALT SERPL-CCNC: 24 U/L (ref 8–45)
ANION GAP SERPL CALC-SCNC: 11 MMOL/L (ref 7–16)
AST SERPL-CCNC: 25 U/L (ref 15–37)
BASOPHILS # BLD: 0.03 K/UL (ref 0–0.2)
BASOPHILS NFR BLD: 0.5 % (ref 0–2)
BILIRUB DIRECT SERPL-MCNC: 0.1 MG/DL (ref 0–0.3)
BILIRUB SERPL-MCNC: 0.3 MG/DL (ref 0–1.2)
BUN SERPL-MCNC: 9 MG/DL (ref 8–23)
CALCIUM SERPL-MCNC: 9.3 MG/DL (ref 8.8–10.2)
CHLORIDE SERPL-SCNC: 106 MMOL/L (ref 98–107)
CHOLEST SERPL-MCNC: 198 MG/DL (ref 0–200)
CO2 SERPL-SCNC: 24 MMOL/L (ref 20–29)
CREAT SERPL-MCNC: 0.68 MG/DL (ref 0.6–1.1)
DIFFERENTIAL METHOD BLD: ABNORMAL
EOSINOPHIL # BLD: 0.08 K/UL (ref 0–0.8)
EOSINOPHIL NFR BLD: 1.3 % (ref 0.5–7.8)
ERYTHROCYTE [DISTWIDTH] IN BLOOD BY AUTOMATED COUNT: 15.8 % (ref 11.9–14.6)
GLOBULIN SER CALC-MCNC: 3.1 G/DL (ref 2.3–3.5)
GLUCOSE SERPL-MCNC: 87 MG/DL (ref 70–99)
HCT VFR BLD AUTO: 38.8 % (ref 35.8–46.3)
HDLC SERPL-MCNC: 85 MG/DL (ref 40–60)
HDLC SERPL: 2.3 (ref 0–5)
HGB BLD-MCNC: 12.6 G/DL (ref 11.7–15.4)
IMM GRANULOCYTES # BLD AUTO: 0.01 K/UL (ref 0–0.5)
IMM GRANULOCYTES NFR BLD AUTO: 0.2 % (ref 0–5)
LDLC SERPL CALC-MCNC: 104 MG/DL (ref 0–100)
LYMPHOCYTES # BLD: 2.98 K/UL (ref 0.5–4.6)
LYMPHOCYTES NFR BLD: 47.8 % (ref 13–44)
MCH RBC QN AUTO: 23 PG (ref 26.1–32.9)
MCHC RBC AUTO-ENTMCNC: 32.5 G/DL (ref 31.4–35)
MCV RBC AUTO: 70.8 FL (ref 82–102)
MONOCYTES # BLD: 0.42 K/UL (ref 0.1–1.3)
MONOCYTES NFR BLD: 6.7 % (ref 4–12)
NEUTS SEG # BLD: 2.71 K/UL (ref 1.7–8.2)
NEUTS SEG NFR BLD: 43.5 % (ref 43–78)
NRBC # BLD: 0 K/UL (ref 0–0.2)
PLATELET # BLD AUTO: 193 K/UL (ref 150–450)
PMV BLD AUTO: 11.8 FL (ref 9.4–12.3)
POTASSIUM SERPL-SCNC: 3.9 MMOL/L (ref 3.5–5.1)
PROT SERPL-MCNC: 6.7 G/DL (ref 6.3–8.2)
RBC # BLD AUTO: 5.48 M/UL (ref 4.05–5.2)
SODIUM SERPL-SCNC: 141 MMOL/L (ref 136–145)
TRIGL SERPL-MCNC: 46 MG/DL (ref 0–150)
VLDLC SERPL CALC-MCNC: 9 MG/DL (ref 6–23)
WBC # BLD AUTO: 6.2 K/UL (ref 4.3–11.1)

## 2025-04-24 PROCEDURE — 3075F SYST BP GE 130 - 139MM HG: CPT | Performed by: INTERNAL MEDICINE

## 2025-04-24 PROCEDURE — G0296 VISIT TO DETERM LDCT ELIG: HCPCS | Performed by: INTERNAL MEDICINE

## 2025-04-24 PROCEDURE — 99214 OFFICE O/P EST MOD 30 MIN: CPT | Performed by: INTERNAL MEDICINE

## 2025-04-24 PROCEDURE — 3078F DIAST BP <80 MM HG: CPT | Performed by: INTERNAL MEDICINE

## 2025-04-24 RX ORDER — UMECLIDINIUM BROMIDE AND VILANTEROL TRIFENATATE 62.5; 25 UG/1; UG/1
1 POWDER RESPIRATORY (INHALATION) DAILY
Qty: 30 EACH | Refills: 5 | Status: SHIPPED | OUTPATIENT
Start: 2025-04-24

## 2025-04-24 NOTE — PROGRESS NOTES
Miguel Ledesma [place] [address]  Phone: [phone] Fax: [phone]    Hyperlipidemia  This is a chronic problem.      Arlette Perez is a 60 y.o. Black /  female.     Current Outpatient Medications   Medication Sig Dispense Refill    ondansetron (ZOFRAN-ODT) 4 MG disintegrating tablet Take 1 tablet by mouth 3 times daily as needed for Nausea or Vomiting 21 tablet 0    albuterol sulfate HFA (VENTOLIN HFA) 108 (90 Base) MCG/ACT inhaler Inhale 2 puffs into the lungs 4 times daily as needed for Wheezing 18 g 5    umeclidinium-vilanterol (ANORO ELLIPTA) 62.5-25 MCG/ACT inhaler Inhale 1 puff into the lungs daily 30 each 5    metoprolol succinate (TOPROL XL) 25 MG extended release tablet Take 1 tablet by mouth daily 90 tablet 3    nitroGLYCERIN (NITROSTAT) 0.4 MG SL tablet Place 1 tablet under the tongue every 5 minutes as needed for Chest pain 25 tablet 5    rosuvastatin (CRESTOR) 20 MG tablet Take 1 tablet by mouth daily 90 tablet 3    ticagrelor (BRILINTA) 90 MG TABS tablet Take 1 tablet by mouth 2 times daily 180 tablet 3    esomeprazole (NEXIUM) 40 MG delayed release capsule Take 1 capsule by mouth every morning (before breakfast) 30 capsule 5    aspirin 81 MG EC tablet Take 1 tablet by mouth daily       No current facility-administered medications for this visit.     No Known Allergies    Past Medical History:   Diagnosis Date    Anemia     not currently taking iron supplements; had hysterectomy    Benign essential tremor 03/15/2018    CAD (coronary artery disease) 2021    GERD (gastroesophageal reflux disease)     no meds    History of chicken pox     Hypertension     pt denies    OAB (overactive bladder) 2014     Past Surgical History:   Procedure Laterality Date     SECTION      x3    COLONOSCOPY      DILATION AND CURETTAGE OF UTERUS      LAP,TUBAL CAUTERY      LAPAROSCOPIC HYSTERECTOMY  2013

## 2025-04-24 NOTE — PATIENT INSTRUCTIONS
Please arrive 20 minutes prior to your scheduled time to see the doctor to allow sufficient time for check-in and rooming.      Please bring all your prescription bottles to each appointment.      I recommend all standard healthcare maintenance and cancer screenings (Colon cancer screening if due, Lung cancer screening if due, Mammogram and Bone Density if female and appropriate, etc) and standard CDC recommended immunizations (https://www.cdc.gov/vaccines-adults/recommended-vaccines/index.html and https://www.cdc.gov/vaccines/imz-schedules/adult-easyread.html  ) as appropriate and due (unless contraindicated)  to lower your risk for potentially preventable morbidity/mortality from these diseases.     Check BP 1-2 times per week.  Call our office if BP runs above 140/80.     Recommend tobacco cessation if you use tobacco products.         Learning About Lung Cancer Screening  What is screening for lung cancer?     Lung cancer screening is a way to find some lung cancers early, before a person has any symptoms of the cancer.  Lung cancer screening may help those who have the highest risk for lung cancer--people age 50 and older who are or were heavy smokers. For most people, who aren't at increased risk, screening for lung cancer probably isn't helpful.  Screening won't prevent cancer. And it may not find all lung cancers. Lung cancer screening may lower the risk of dying from lung cancer in a small number of people.  How is it done?  Lung cancer screening is done with a low-dose CT (computed tomography) scan. A CT scan uses X-rays, or radiation, to make detailed pictures of your body. Experts recommend that screening be done in medical centers that focus on finding and treating lung cancer.  Who is screening recommended for?  Lung cancer screening is recommended for people age 50 and older who are or were heavy smokers. That means people with a smoking history of at least 20 pack years. A pack year is a way to

## 2025-04-25 ENCOUNTER — RESULTS FOLLOW-UP (OUTPATIENT)
Dept: INTERNAL MEDICINE CLINIC | Facility: CLINIC | Age: 60
End: 2025-04-25

## 2025-05-12 NOTE — PROGRESS NOTES
[facility] NEUROLOGY  [address], [address]  [place] [address]  Phone: [phone] Fax [phone]  Endy Daniels MD      Patient: Arlette Perez  Provider: Endy Daniels MD    CC:   Chief Complaint   Patient presents with    New Patient    Tremors     Referring Provider:    History of Present Illness:     Arlette Perez is a 60 y.o. RH female who presents for evaluation of tremors.     She is unaccompanied for today's visit.     Patient presents for further evaluation of tremors in the hands, with a previous diagnosis of benign essential tremor.    She was previously seen by Dr. Joby solis in 2021 and these records been reviewed.  In summary, she has a several year history of tremors of the hands which are best described as a postural/action tremor that would impair tasks requiring dexterity such as writing, eating with utensils, and holding a drink steady.  She denies any tremors of the head or voice.  There is a family history of tremor, including her son, father, and maternal aunt.    Tremor has worsened in severity over the years.  She notes previous medication trials including primidone and topiramate which were poorly tolerated due to cognitive effects.  Records indicate previous trials of buspirone which were without benefit.  She reports previous trials of propranolol which were without benefit and she is currently on metoprolol daily for hypertension.     Her other past medical history includes coronary artery disease with drug-eluting stents, hypertension, hyperlipidemia, COPD    Current medications include (but not limited to):  Metoprolol ER 25 mg daily    Previous medication trials include: Propranolol, primidone, topiramate, buspirone      Review of Systems:   Review of Systems   Constitutional:  Negative for fever.   HENT:  Negative for voice change.    Eyes:  Negative for visual disturbance.   Respiratory:  Negative for cough.    Cardiovascular:  Negative for chest pain.

## 2025-05-13 ENCOUNTER — OFFICE VISIT (OUTPATIENT)
Dept: NEUROLOGY | Age: 60
End: 2025-05-13
Payer: COMMERCIAL

## 2025-05-13 VITALS
BODY MASS INDEX: 29.64 KG/M2 | SYSTOLIC BLOOD PRESSURE: 134 MMHG | WEIGHT: 147 LBS | DIASTOLIC BLOOD PRESSURE: 85 MMHG | HEART RATE: 84 BPM | HEIGHT: 59 IN

## 2025-05-13 DIAGNOSIS — G25.0 ESSENTIAL TREMOR: Primary | ICD-10-CM

## 2025-05-13 PROCEDURE — 99203 OFFICE O/P NEW LOW 30 MIN: CPT | Performed by: PSYCHIATRY & NEUROLOGY

## 2025-05-13 PROCEDURE — 3075F SYST BP GE 130 - 139MM HG: CPT | Performed by: PSYCHIATRY & NEUROLOGY

## 2025-05-13 PROCEDURE — 3079F DIAST BP 80-89 MM HG: CPT | Performed by: PSYCHIATRY & NEUROLOGY

## 2025-05-13 ASSESSMENT — ENCOUNTER SYMPTOMS
VOICE CHANGE: 0
ABDOMINAL PAIN: 0
COUGH: 0

## 2025-05-13 NOTE — PATIENT INSTRUCTIONS
1.  Deep brain stimulation.  This is a surgical implant into the brain that is connected to a \"pacemaker\" device that sits in the chest and helps alleviate tremors.     2.  Focused ultrasound.  Is an outpatient procedure using focused ultrasound beams to illuminate the tremor signals from a specific area in the brain.     ([facility] with Dr. Lozano).    I am sending you to [place] to discuss these options ([facility] with Dr. Aba Lozano).

## 2025-06-25 ENCOUNTER — TELEPHONE (OUTPATIENT)
Dept: ENT CLINIC | Age: 60
End: 2025-06-25

## 2025-06-25 RX ORDER — ESOMEPRAZOLE MAGNESIUM 40 MG/1
40 CAPSULE, DELAYED RELEASE ORAL
Qty: 30 CAPSULE | Refills: 5 | Status: SHIPPED | OUTPATIENT
Start: 2025-06-25

## 2025-06-25 NOTE — TELEPHONE ENCOUNTER
Ruddy appointment on 7/31/2025 but she is close to running out of her esomeprazole (NEXIUM) 40 MG she has not been in the office since 2023 but is requesting a refill.

## 2025-07-31 ENCOUNTER — OFFICE VISIT (OUTPATIENT)
Dept: ENT CLINIC | Age: 60
End: 2025-07-31
Payer: COMMERCIAL

## 2025-07-31 VITALS — BODY MASS INDEX: 29.52 KG/M2 | WEIGHT: 146.4 LBS | HEIGHT: 59 IN | RESPIRATION RATE: 10 BRPM

## 2025-07-31 DIAGNOSIS — K21.9 LPRD (LARYNGOPHARYNGEAL REFLUX DISEASE): Primary | ICD-10-CM

## 2025-07-31 PROCEDURE — 99213 OFFICE O/P EST LOW 20 MIN: CPT | Performed by: OTOLARYNGOLOGY

## 2025-07-31 ASSESSMENT — ENCOUNTER SYMPTOMS
EYES NEGATIVE: 1
ALLERGIC/IMMUNOLOGIC NEGATIVE: 1
RESPIRATORY NEGATIVE: 1
GASTROINTESTINAL NEGATIVE: 1

## 2025-07-31 NOTE — PROGRESS NOTES
Chief Complaint   Patient presents with    Follow-up     LPR check        HPI:  Arlette Perez is a 60 y.o. female seen in follow-up for her throat.  I had seen her back in 2023 with concern for LPRD.  I had treated her with Nexium 40 mg daily and had even added Pepcid 20 mg nightly at that time.  Last seen back on 3/17/2023.  She has continued taking Nexium every morning but no longer takes Pepcid at night.  As long as she takes her PPI, her symptoms have been well-controlled, but when she has forgotten to take her meds, she reports an intermittent burning sensation in her throat.  I had refilled her Nexium about 1 month ago.  Today, she denies any chronic sore throat, change in swallowing, or hoarseness.    Past Medical History, Past Surgical History, Family history, Social History, and Medications were all reviewed with the patient today and updated as necessary.     No Known Allergies  Patient Active Problem List   Diagnosis    Coronary artery disease of native artery of native heart with stable angina pectoris    Essential hypertension    Mixed hyperlipidemia    Tobacco use    Atherosclerosis of abdominal aorta    Stage 3 severe COPD by GOLD classification (HCC)     Current Outpatient Medications   Medication Sig    esomeprazole (NEXIUM) 40 MG delayed release capsule Take 1 capsule by mouth every morning (before breakfast)    umeclidinium-vilanterol (ANORO ELLIPTA) 62.5-25 MCG/ACT inhaler Inhale 1 puff into the lungs daily    albuterol sulfate HFA (VENTOLIN HFA) 108 (90 Base) MCG/ACT inhaler Inhale 2 puffs into the lungs 4 times daily as needed for Wheezing    metoprolol succinate (TOPROL XL) 25 MG extended release tablet Take 1 tablet by mouth daily    nitroGLYCERIN (NITROSTAT) 0.4 MG SL tablet Place 1 tablet under the tongue every 5 minutes as needed for Chest pain    rosuvastatin (CRESTOR) 20 MG tablet Take 1 tablet by mouth daily    ticagrelor (BRILINTA) 90 MG TABS tablet Take 1 tablet by mouth 2 times

## (undated) DEVICE — AMD ANTIMICROBIAL GAUZE SPONGES,12 PLY USP TYPE VII, 0.2% POLYHEXAMETHYLENE BIGUANIDE HCI (PHMB): Brand: CURITY

## (undated) DEVICE — Device

## (undated) DEVICE — STANDARD HYPODERMIC NEEDLE,POLYPROPYLENE HUB: Brand: MONOJECT

## (undated) DEVICE — SUT ETHLN 4-0 18IN PS2 BLK --

## (undated) DEVICE — TRAY PREP DRY W/ PREM GLV 2 APPL 6 SPNG 2 UNDPD 1 OVERWRAP

## (undated) DEVICE — OCCLUSIVE GAUZE STRIP,3% BISMUTH TRIBROMOPHENATE IN PETROLATUM BLEND: Brand: XEROFORM

## (undated) DEVICE — SUTURE VCRL SZ 3-0 L18IN ABSRB UD PS-2 L19MM 3/8 CRV PRIM J497H

## (undated) DEVICE — SUTURE ETHLN SZ 4-0 L18IN NONABSORBABLE BLK L24MM FS-1 3/8 1629H

## (undated) DEVICE — GAUZE SPONGES,12 PLY: Brand: CURITY

## (undated) DEVICE — PRECISION THIN (9.0 X 0.38 X 31.0MM)

## (undated) DEVICE — REM POLYHESIVE ADULT PATIENT RETURN ELECTRODE: Brand: VALLEYLAB

## (undated) DEVICE — 2000CC GUARDIAN II: Brand: GUARDIAN

## (undated) DEVICE — PADDING CAST W4INXL4YD ST COT COHESIVE HND TEARABLE SPEC

## (undated) DEVICE — SOLUTION IV 1000ML 0.9% SOD CHL

## (undated) DEVICE — LARGE TEAR CROSS CUT RASP (14.0 X 7.0MM)

## (undated) DEVICE — INTENDED FOR TISSUE SEPARATION, AND OTHER PROCEDURES THAT REQUIRE A SHARP SURGICAL BLADE TO PUNCTURE OR CUT.: Brand: BARD-PARKER ® STAINLESS STEEL BLADES

## (undated) DEVICE — STRETCH BANDAGE ROLL: Brand: DERMACEA

## (undated) DEVICE — STERILE HOOK LOCK LATEX FREE ELASTIC BANDAGE 4INX5YD: Brand: HOOK LOCK™

## (undated) DEVICE — NEEDLE HYPO 21GA L1.5IN INTRAMUSCULAR S STL LATCH BVL UP

## (undated) DEVICE — SYR LR LCK 1ML GRAD NSAF 30ML --

## (undated) DEVICE — BUTTON SWITCH PENCIL BLADE ELECTRODE, HOLSTER: Brand: EDGE

## (undated) DEVICE — GOWN,REINF,POLY,ECL,PP SLV,XL: Brand: MEDLINE